# Patient Record
Sex: FEMALE | Race: BLACK OR AFRICAN AMERICAN | Employment: STUDENT | URBAN - METROPOLITAN AREA
[De-identification: names, ages, dates, MRNs, and addresses within clinical notes are randomized per-mention and may not be internally consistent; named-entity substitution may affect disease eponyms.]

---
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
## 2025-01-06 ENCOUNTER — OFFICE VISIT (OUTPATIENT)
Dept: INTERNAL MEDICINE CLINIC | Facility: CLINIC | Age: 20
End: 2025-01-06
Payer: COMMERCIAL

## 2025-01-06 VITALS
DIASTOLIC BLOOD PRESSURE: 80 MMHG | RESPIRATION RATE: 16 BRPM | WEIGHT: 266 LBS | HEIGHT: 67 IN | HEART RATE: 90 BPM | BODY MASS INDEX: 41.75 KG/M2 | SYSTOLIC BLOOD PRESSURE: 120 MMHG

## 2025-01-06 DIAGNOSIS — E66.01 CLASS 3 SEVERE OBESITY WITH SERIOUS COMORBIDITY AND BODY MASS INDEX (BMI) OF 45.0 TO 49.9 IN ADULT, UNSPECIFIED OBESITY TYPE (HCC): ICD-10-CM

## 2025-01-06 DIAGNOSIS — R73.03 PREDIABETES: ICD-10-CM

## 2025-01-06 DIAGNOSIS — E66.813 CLASS 3 SEVERE OBESITY WITH SERIOUS COMORBIDITY AND BODY MASS INDEX (BMI) OF 45.0 TO 49.9 IN ADULT, UNSPECIFIED OBESITY TYPE (HCC): ICD-10-CM

## 2025-01-06 DIAGNOSIS — Z51.81 ENCOUNTER FOR THERAPEUTIC DRUG MONITORING: Primary | ICD-10-CM

## 2025-01-06 RX ORDER — TIRZEPATIDE 5 MG/.5ML
5 INJECTION, SOLUTION SUBCUTANEOUS WEEKLY
Qty: 2 ML | Refills: 3 | Status: CANCELLED | OUTPATIENT
Start: 2025-01-06

## 2025-01-06 RX ORDER — TIRZEPATIDE 10 MG/.5ML
10 INJECTION, SOLUTION SUBCUTANEOUS WEEKLY
Qty: 2 ML | Refills: 2 | Status: SHIPPED | OUTPATIENT
Start: 2025-01-06

## 2025-01-06 RX ORDER — TIRZEPATIDE 7.5 MG/.5ML
7.5 INJECTION, SOLUTION SUBCUTANEOUS WEEKLY
Qty: 2 ML | Refills: 0 | Status: SHIPPED | OUTPATIENT
Start: 2025-01-06

## 2025-01-06 RX ORDER — PHENTERMINE HYDROCHLORIDE 37.5 MG/1
37.5 CAPSULE ORAL EVERY MORNING
Qty: 30 CAPSULE | Refills: 3 | Status: SHIPPED | OUTPATIENT
Start: 2025-01-06

## 2025-01-06 NOTE — PATIENT INSTRUCTIONS
Continue making lifestyle changes that focus on good nutrition, regular exercise and stress management.    Medication Plan: Continue Phentermine and increase Zepbound to 7.5 mg weekly x4 weeks and then 10 mg weekly thereafter.    Tips while taking an injectable weight loss medication:    Be an intuitive eater. Listen to your hunger and fullness signals, stopping when you are full.  Consume protein and produce in your day, striving for a rainbow of color of produce.  Reduce portions to staring size of 1 cup size and check in with your gut to see if you are full. Use a sand timer to slow down your eating pace to allow for 15-20 minutes to complete a meal and use the \"2 bite rule\".  Reduce refined sugars and high fat foods, as they may contribute to greater side effects of nausea and heartburn.  Stop eating 3 hours before bedtime to allow your food to digest.  Remain hydrated with water or non caloric and non caffeine beverages.  Use over the counter marcello lozenge/supplement to help reduce nausea if needed.  If you have been off your medication for more than 2 weeks please notify our office to determine next dosing, as return to previous dose may not be appropriate or tolerated.    Next steps to work on before next office visit include:  Keep up the great work!       I recommend self monitoring to support behavior change and to learn how your environment individually impacts YOUR body. This will help promote intuitive eating practices. Goal is to track nutrition 2x/week via paper log, 3ROAM Destiny or LoseIT! Destiny, and log weekly home scale weight.    Self-Monitoring - The Way to Successful Weight Management    By Kari Gr RD, RAYMOND, Pastora Roblero RD, RAYMOND, Will Angel PA-C, and Josefa Shaver MD    [facility] [email or web address] Winter 2008 Resource    One major and possibly most important behavioral interventional strategy for weight management and lifestyle change is self-monitoring. Behavioral interventions are a  central aspect in treatments to promote lifestyle changes that lead to weight-loss, prevent weight gain or weight regain and improve physical fitness. In the past, self-monitoring has unfortunately been one of the least popular techniques for those in weight management programs, and in some cases it is even thought of as a punishment. Because self-monitoring is critical for success with lifestyle changes, it is important to look at the various self monitoring techniques.    What is Self-monitoring?  Self-monitoring refers to the observing and recording of eating and exercise patterns, followed by feedback on the behaviors. The goal of self-monitoring is to increase self-awareness of target behaviors and outcomes, thus it can serve as an early warning system if problems are arising and can help track success. Some commonly used self-monitoring techniques include:    Food diaries  Regular self-weighing  Exercise logs  Equipment such as pedometers, accelerometers and metabolic devices  Food Logs and Diaries  One of the most common and important types of self-monitoring strategies in weight management programs is keeping a food log, in which individuals record foods, exercises or beverages as soon as they are consumed.    One important technique with food logs is individuals recording what they eat or drink as it is consumed; otherwise it may not give an accurate account of the day’s intake. A good “rule of thumb” for food logs is: “if you bite it, you write it!”    The minimum information for weight-loss that should be kept in food logs is type, amount and caloric content of food or beverage consumed. This provides the ability to track and balance the number of calories consumed throughout the day with the amount of calories expended throughout the day.    Other nutritional information that can be logged includes: time of day of eating, fat content and carbohydrate grams. Disease-specific food logs can also be kept.  For example: focusing on carbohydrate content instead of calories for patients with diabetes or insulin resistance.    Food Diaries  Another helpful tool in self-monitoring is keeping a food diary. Food diaries differ from food logs because they include more detailed information. They are useful if you are trying to find behavioral reasons or psychological aspects for eating.    Depending on the person and behavioral complexities involved, some food diaries could include the stress level, mood or feelings surrounding eating, activity or location or other environmental or emotional triggers for eating. The more complex or detailed, the better the feedback.    However, in today’s society it is almost impossible for most people to keep highly detailed daily food records over the long-term, therefore, compliance is often very low with detailed food diaries. By suggesting that patients keep a detailed food record for a few days each week, perhaps major areas of focus for nutritional and behavioral intervention can be recognized.    Logging Your Food Online  Online food logs and diaries or computer software are quick and convenient ways to keep records of foods consumed in our technologically advanced world. Many Web sites are available for tracking of foods and calories throughout the day, some of which are free and very easy to use.    You can look up food choices and/or alternative choices in online databases of more than 50,000 foods. Internet-savvy loggers may choose to keep their journals online. Others may just choose to use these databases as a more convenient way of looking up nutritional value of foods. Some free online diaries include:    Free Web sites for searching nutritional information are available, an example is www.Vacation Your Way.Activity Rocket. These Web sites may also offer exercise tracking and ideas, support, motivational tips and chat or discussion rooms.    Hand-held Calorie Counters  Another option for those  who are “on the go” are handheld devices for calorie counting. Some of the devices are stand-alone such as TopLogt® or National Bananaer®. Others need to connect to Web sites. Other devices are installed in your Palm or Pocket-PC such as Diet Diary by Theo Gio. They let you download updates when nutrition facts change, however, some of them use a lot of memory.    Regular Weighing  Weighing yourself is an important and simple self-monitoring behavior to serve as reminder of one’s eating and physical activity habits. Although it may be hard and sometimes discouraging to weigh yourself while losing weight, it is recommended to weigh yourself weekly, preferably outside of the home on the same scale.    Using the scale at the local gym or exercise facility or your doctor’s office may be more accurate than home scales. However, if this is unrealistic, it is okay to use a home scale. Try to weigh yourself at the same time of day and the same day of the week.    Writing down your weekly weights on a table, graph or calendar can help you keep track of your success or to help you get back on track more quickly. It is important to note that weighing yourself more frequently than weekly is not recommended, as day to day fluctuations are not indicators of actual weight. Regular monitoring of your weight is also essential to help you maintain your weight after losing weight.    Exercise Logs  Another self-monitoring technique, along the same lines as food logs and diaries, is keeping an exercise log or diary. The number of minutes engaged and type and level of exertion of physical activity should ideally be recorded.    An important and often forgotten aspect of exercise logs is the level of perceived exertion. Walking for 30 minutes, at an easy compared to a hard pace, will result in different levels of calories burned and cardiovascular impact.    Typically, an easy physical activity that does not increase heart rate  much, or alter breathing would usually be the pace that you walk around work or go shopping. Moderate level of physical exertion is when you are getting a mildly increased heart and breathing rate. Heavy or hard level of physical exertion would be sweating, increased heart rate (target heart rate range) as well as increased breathing.    Remember, physical activity can be done at one time or intermittently throughout the day. Logging exercise can be a positive feedback or a reminder to incorporate more exercise or physical activity into your daily routine.    Initial activities may be walking, riding a stationary bike or swimming at a slow pace. Other types of exercise that can be fun are dancing, exercise videos or chair exercises. You should try to aim for 30 minutes of exercise on most days of the week.    Many people try to start out with exercise on three or four days of the week. However, if you can get yourself exercising most to all days of the week, even if only for 10 or 15 minutes, it will become more of a routine for you.    Healthy Lifestyle Tip  All adults should set a long-term goal to accumulate at least 30 minutes or more of moderate-intensity physical activity on most to all days of the week. Also, try to increase activities of daily living such as taking the stairs instead of the elevator, parking further away or walking to a bathroom that is further from your desk. Reducing sedentary time is a good strategy to increase activity by undertaking frequent, less strenuous activities. With time, you may be able to engage in more strenuous activities.    Pedometers  Self-monitoring tools are becoming more and more popular and accurate. One of the simplest of these self-monitoring tools is a pedometer. Pedometers give objective data of physical activity throughout the day. Pedometers can be found in almost any consumer MobileAware or retail store.    Some of the more popular manufactures include Digi-Walker,  Omron, Acumen, Bodytrend, Red Advertising, SportWantering, Freestyle, Immunity Project, AccuStep and many others. ContaAzul and [employer] make pedometer of speedometer devices that calculate steps and speed using GPS. These clip-on devices are inexpensive, ranging from less than $15 up to $75.    Many people get an average of 3,000 steps per day with daily activity. In order to burn off extra calories for weight-loss, walking 10,000 steps per day is recommended. For regular health, a minimum of 6,000 steps per day is required. Research suggests that a deliberate walk of 4,000-6,000 steps will help with weight-loss. It is often a good idea to keep track of your daily steps taken in your exercise log.    Pedometers can be frustrating for those who are more interested in distance traveled. Focusing on the number of steps and ways to incorporate more steps throughout the day will make as much of a difference with weight-loss as actual distance does. Pedometers encourage people to find ways to add more steps throughout the day.    Because step counting is becoming more popular, advances are being made in the technology behind pedometers. New pedometers display steps and count them accurately. They are meant to be worn everyday and all day, as motivation to keep stepping, Most are small and comfortable to wear.    Pedometers sense your body motion, counting your footsteps usually by a turned pendulum technology, a coiled spring mechanism and a hairspring mechanism (which is the least accurate). The unit should be accurate in its count when you wear it correctly. You may need to experiment with where to wear it. You can measure your stride and then the pedometer can estimate distance traveled.    Some pedometers today offer multifunction options like calorie estimates, clocks, timers, stopwatches, speed estimators, seven-day memory or pulse rate readers, voice feedback and radios.    Accelerometers  Although pedometers are very  cost-effective, one of the main flaws in using pedometers, however, is that they do not record intensity (how hard) or duration (how long) or frequency (how often) movement occurs. Accelerometers are devices that can objectively measure frequency, duration and intensity of physical activity.    Accelerometers provide a high level of accuracy when assessing physical activity. There are a variety of commercially available accelerometers, or activity monitors, which come in a wide range of prices anywhere from $50 to $1,000. BioTrainer and Nike are examples of affordable accelerometers.    Many of the more expensive accelerometers are used only in research or as a part of a hospital-based program. These monitors are more complex than pedometers in that they display and store more complex data. Some are designed to download to a computer for analysis of intensity levels, movements and physical activity patterns. They can also be used to estimate calories burned or energy expenditure.    Accelerometers have sophisticated sensors that convert physical movement into an electrical signal that is relative to the muscular force needed to produce the work. Accelerometers can be found in uniaxial or triaxial measures. Uniaxial accelerometers measure in a single plane and can be attached to the trunk or limbs. Triaxial accelerometers measure along three planes: vertical, medial-lateral and anterior-posterior.    Although accelerometers are a step up from pedometers in accuracy of physical activity, they cannot register resistance. Therefore, if you are strength training or adding resistance to your bike or treadmill or adding an incline to your walking, it will not be able to discern the added level of energy required to do that work.    Metabolic Devices  One of the most accurate and most expensive tools for self-monitoring are tools that have very sophisticated monitoring and interpreting sensors for calories burned. Many of  these devices have options to subscribe to a Web-based calorie counter system that integrates the amount of calories burned measured by the equipment and your calories consumed that you enter in easy to use food logs.    These devices are more accurate in measurements of calories expended because they use not only accelerometer technology, but also heat flux sensors, galvanic skin response (to measure physical exertion and emotional stimuli) and skin temperature gauges. Some also include heart rate monitoring techniques. All of these technologies combined lead to a very accurate measurement of calories expended throughout the day.    These devices can determine if you are sitting, sleeping, jogging, walking, lifting weights or riding in a car. Many of these devices are very expensive and used primarily for research, however, some are available commercially.    This technology is also employed by hospital-based programs. Patients wear the hospital’s armband and track their nutrition on the Web site or computer-based program for typically one to two weeks. When they return to the clinic, the information will be uploaded and the practitioners will be able to work with the patients with objective data on metabolic lifestyle patterns.    Practitioners can also monitor patients on integrated software applications to provide consultations without being face to face. Practitioners have the ability to set daily goals to tailor programs to the individual patient. These are great tools to help objectively monitor behavior and physical activity, as well as providing real time feed back to the patient. [employer] is one company that offers this technology.    Conclusion  Although specific diseases and treatments vary, behavior modification is the major key in weight-loss or prevention and decreases the risk of diseases. Self-monitoring is a key to behavior modifications, and there are a multitude of ways to self-monitor. With  technology advancements, self-monitoring techniques are changing and improving to help defeat some of the major barriers to compliance. The bottom line is that no matter how you do it, self-monitoring should be an important part of your weight-loss, weight maintenance or healthy lifestyle change. Then, the next step is to be sure the self-monitoring translates into positive behavior changes with regards to diet and exercise.    Balanced Nutrition includes:     Build the mentality of Food 4 Fuel. Clean eating with whole foods and eliminating/reducing ultra processed foods.  Be an intuitive eater and using mindful eating practices.  Eat a balanced plate with protein and produce at all meals: 1/4 plate- protein, 1/2 plate non starchy veggies, and 1/4 plate fruit or complex carbohydrate.  Drink water with all meals and use a salad plate to naturally reduce portions.  Eliminate/reduce late night eating by stopping after 7pm. Allowing your body to fast for 12 hours (drink only water, tea or black coffee without any additives).            What are proteins?  Proteins are one of three primary macronutrients that provide energy to the human body, along with fats and carbohydrates. Proteins are also responsible for a large portion of the work that is done in cells; they are necessary for proper structure and function of tissues and organs, and also act to regulate them. They are comprised of a number of amino acids that are essential to proper body function, and serve as the building blocks of body tissue.  There are 20 different amino acids in total, and the sequence of amino acids determines a protein's structure and function. While some amino acids can be synthesized in the body, there are 9 amino acids that humans can only obtain from dietary sources (insufficient amounts of which may sometimes result in death), termed essential amino acids. Foods that provide all of the essential amino acids are called complete protein  sources, and include both animal (meat, dairy, eggs, fish) as well as plant-based sources (soy, quinoa, buckwheat).    Proteins can be categorized based on the function they provide to the body. Below is a list of some types of proteins:  Antibody--proteins that protect the body from foreign particles, such as viruses and bacteria, by binding to them  Enzyme--proteins that help form new molecules as well as perform the many chemical reactions that occur throughout the body  Messenger--proteins that transmit signals throughout the body to maintain body processes  Structural component--proteins that act as building blocks for cells that ultimately allow the body to move  Transport/storage--proteins that move molecules throughout the body  As can be seen, proteins have many important roles throughout the body, and as such, it is important to provide sufficient nutrition to the body to maintain healthy protein levels.    How much protein do I need?  The amount of protein that the human body requires daily is dependent on many conditions, including overall energy intake, growth of the individual, and physical activity level. It is often estimated based on body weight, as a percentage of total caloric intake (10-35%), or based on age alone. 0.8g/kg of body weight is a commonly cited recommended dietary allowance (RDA). This value is the minimum recommended value to maintain basic nutritional requirements, but consuming more protein, up to a certain point, maybe beneficial, depending on the sources of the protein.  The recommended range of protein intake is between 0.8 g/kg and 1.8 g/kg of body weight, dependent on the many factors listed above. People who are highly active, or who wish to build more muscle should generally consume more protein. Some sources suggest consuming between 1.8 to 2 g/kg for those who are highly active. The amount of protein a person should consume, to date, is not an exact science, and each  individual should consult a specialist, be it a dietitian, doctor, or , to help determine their individual needs.    Foods high in protein  There are many different combinations of food that a person can eat to meet their protein intake requirements. For many people, a large portion of protein intake comes from meat and dairy, though it is possible to get enough protein while meeting certain dietary restrictions you might have. Generally, it is easier to meet your RDA of protein by consuming meat and dairy, but an excess of either can have a negative health impact. There are plenty of plant-based protein options, but they generally contain less protein in a given serving. Ideally, a person should consume a mixture of meat, dairy, and plant-based foods in order to meet their RDA and have a balanced diet replete with nutrients.    If possible, consuming a variety of complete proteins is recommended. A complete protein is a protein that contains a good amount of each of the nine essential amino acids required in the human diet. Examples of complete protein foods or meals include:    Meat/Dairy examples  Eggs  Chicken breast  Cottage cheese  Greek yogurt  Milk  Lean beef  Tuna  Turkey breast  Fish  Shrimp    Vegan/plant-based examples  Buckwheat  Hummus and areli  Soy products (tofu, tempeh, edamame beans)  Peanut butter on toast or some other bread  Beans and rice  Quinoa  Hemp and naomi seeds  Spirulina  Generally, meat, poultry, fish, eggs, and dairy products are complete protein sources. Nuts and seeds, legumes, grains, and vegetables, among other things, are usually incomplete proteins. There is nothing wrong with incomplete proteins however, and there are many healthy, high protein foods that are incomplete proteins. As long as you consume a sufficient variety of incomplete proteins to get all the required amino acids, it is not necessary to specifically eat complete protein foods. In fact, certain  high fat red meats for example, a common source of complete proteins, can be unhealthy.   Below are some examples of high protein foods that are not complete proteins:  Almonds  Oats  Broccoli  Lentils  Ryan bread  Bj seeds  Pumpkin seeds  Peanuts  Henry sprouts  Grapefruit  Green peas  Avocados  Mushrooms  As can be seen, there are many different foods a person can consume to meet their RDA of protein. The examples provided above do not constitute an exhaustive list of high protein or complete protein foods. As with everything else, balance is important, and the examples provided above are an attempt at providing a list of healthier protein options (when consumed in moderation).    Amount of protein in common food      Protein Amount  Milk (1 cup/8 oz)  8 g  Egg (1 large/50 g)  6 g  Meat (1 slice / 2 oz)  14 g  Seafood (2 oz)  16 g  Bread (1 slice/64 g)   8 g  Corn (1 cup/166 g)  16 g  Rice (1 cup/195 g)  5 g  Dry Bean (1 cup/92 g)   16 g  Nuts (1 cup/92 g)    20 g  Fruits and Veggie (1 cup)  1 g    Data above taken from www.calculator.net    The Power of Protein:    High Protein Foods:  FISH  (3-6 ounces/meal)  All types of fish  Seafood (shrimp, scallops, clams, mussels, lobster)  EGGS     2-3 eggs/meal  DAIRY (2/3 to 1 ½ cup)  Cottage cheese   Greek yogurt  PLANTS (½-3/4 cup/meal)  Legumes: Dried beans and peas (black beans, oconnell beans, garbanzo beans, kidney, cannellini, navy, split peas, black eyed peas)  Lentils  Quinoa  Soy (edamame, tofu)  PORK   (3-6 oz/meal)  Tenderloin  Pork chop  Top loin roast, boneless  Sirloin roast, boneless  Nigerien vasquez (nitrate free)  Boiled deli ham (nitrate free)    Additional Protein Sources:  BEEF    (3-6 oz/meal)  Flank steak       Skirt steak  Bottom round(rump roast), select   Ground beef, 90% lean (ground sirloin)  Jarod eye steak, choice  Eye of round roast, choice   POULTRY  (3-6 oz/meal)  Ground chicken or turkey  Chicken, no skin  Turkey, no skin  PROTEIN  SHAKES: SHILA and Debbie (plant based and dairy free), Corepower by SocialSamba, Casinityreal (plant based option available), Premier Protein, [employer] Complete (www.Joox.com)  PROTEIN BARS: RXBAR, PowerCrunch, Quest, Barebells, Mosh      Stopped Losing Weight? Fight Your Way through a Weight Plateau  March 12, 2019  Posted in Blog, Motivation  By Your Weight [facility] [facility]     You've managed your food intake, had your fair share of sweat sessions, and you're on a roll with weight-loss. Congrats! Confidence and determination have propelled you to success.  But all of a sudden, the scale stops moving and will no long budge. You haven't slacked off, so what's the deal? You might have found yourself in a frustrating weight plateau.  What's a Weight Plateau?  Plateaus happen when you stop losing weight, even if you're doing everything “right” -- cutting back on portion sizes, eating healthy foods and working out like your life depends on it.  The sudden halt in progress might seem surprising if your initial weight-loss was fast and relatively simple. However, plateaus are common and sometimes inevitable if your metabolism has declined -- usually from muscle-loss and significant changes to your body weight. This means you're burning less calories than before, even if you're doing the same thing.  Tips to Fight back  Weight plateaus can be trying, especially if your motivation was fed by seeing progress on the scale. Still, you haven't done anything wrong. You just need a new game plan!  Are You Building Muscle?  Strength training builds muscle which boosts your metabolism and burns far more calories over time than cardio. Try the “progressive overload” approach of gradually increasing stressed placed on the body during weight training. This includes slowly adding weight and reps.  Adjust Your Calorie Intake  After weight-loss, you may be burning less calories if your metabolism has decreased. So, you might also need to  consume less calories. Re-examine and adjust your food behavior.  Embrace Change in Your Routine  Try a new workout in a new location, preferably something invigorating that you've never done before. Also consider some new recipes to prevent meals from getting boring. Changes to your normal routine are fresh, exciting and usually very motivating.  Manage Stress  Stress can often put the brakes on weight-loss by triggering food cravings and releasing the hormone cortisol. Identify healthy and sustainable stress management techniques  to remain relaxed, focused and balanced in all aspects and areas of your body.  Eat More Protein  Protein burns more calories during digestion than other nutrients, and some studies show it to have fat-burning catalysts. It also keeps you full and aids in building lean muscle mass.  Ensure You're Properly Hydrated  Even when you're mildly dehydrated, your body can crave food despite a true lack in hunger. You should strive for at least  fluid ounces of water each day (even more when you exercise) and replace coffee, tea and alcoholic beverages with water when you can.  Plateaus Can be Conquered  If you've still got weight to lose that will benefit your health, don't feel discouraged in the midst of a plateau. It happens, and it's a sign that you've already made great progress!  Instead, try thinking of the plateau as your next great challenge. When you re-work your mindset and get outside your comfort zone, you'll continue to surprise yourself.

## 2025-01-06 NOTE — PROGRESS NOTES
Ashley Ramirez is a 19 year old female presents today with boyfriend for follow-up on medical weight loss program for the treatment of overweight, obesity, or morbid obesity with associated prediabetes.    S:  Current weight   Wt Readings from Last 6 Encounters:   01/06/25 266 lb (120.7 kg) (>99%, Z= 2.62)*   12/29/24 265 lb (120.2 kg) (>99%, Z= 2.61)*   11/07/24 267 lb (121.1 kg) (>99%, Z= 2.62)*   10/30/24 260 lb (117.9 kg) (>99%, Z= 2.57)*   10/17/24 215 lb (97.5 kg) (98%, Z= 2.15)*   08/28/24 265 lb (120.2 kg) (>99%, Z= 2.59)*     * Growth percentiles are based on [facility] (Girls, 2-20 Years) data.    AND BMI Body mass index is 41.66 kg/m²..    Patient has lost 1# since LOV 2 month ago.  She has been tolerating switch to Zepbound without SEs. Taking Phentermine often every other day. Feeling at a weight plateau. Exercising regular, reduced snacking and meals. Sleeping well and stress reduced since on break from school and only working. No lifestyle log completed.    Testing/consult completed since LOV: Dietician: no    Social hx and PMH reviewed. Employed in retail. In relationship and living with family.    REVIEW OF SYSTEMS:  GENERAL: feels well otherwise  LUNGS: denies shortness of breath with exertion  CARDIOVASCULAR: denies chest pain on exertion, denies palpitations or pedal edema  GI: denies abdominal pain.  No N/V/D/C  MUSCULOSKELETAL: no acute joint or muscle pain  NEURO: denies headaches  PSYCH: denies change in behavior or mood, denies feeling sad or depressed    EXAM:  /80   Pulse 90   Resp 16   Ht 5' 7\" (1.702 m)   Wt 266 lb (120.7 kg)   LMP 12/26/2024 (Approximate)   BMI 41.66 kg/m²   GENERAL: well developed, well nourished, in no apparent distress, morbidly obese  EYES: conjunctiva pink, sclera non icteric, PERRLA  LUNGS: CTA in all fields, breathing non labored  CARDIO: RRR without murmur, normal S1 and S2 without clicks or gallops, no pedal edema.  GI: +BS  NEURO/MS: motor and  sensory grossly intact  PSYCH: pleasant, cooperative, normal mood and affect    ASSESSMENT AND PLAN:  Reviewed Initial Weight Data and Goal Weight Loss:       Encounter Diagnoses   Name Primary?    Encounter for therapeutic drug monitoring Yes    Class 3 severe obesity with serious comorbidity and body mass index (BMI) of 45.0 to 49.9 in adult, unspecified obesity type (HCC)     Prediabetes            No orders of the defined types were placed in this encounter.      Meds & Refills for this Visit:  Requested Prescriptions     Signed Prescriptions Disp Refills    Phentermine HCl 37.5 MG Oral Cap 30 capsule 3     Sig: Take 1 capsule (37.5 mg total) by mouth every morning.    Tirzepatide-Weight Management (ZEPBOUND) 7.5 MG/0.5ML Subcutaneous Solution Auto-injector 2 mL 0     Sig: Inject 7.5 mg into the skin once a week.    Tirzepatide-Weight Management (ZEPBOUND) 10 MG/0.5ML Subcutaneous Solution Auto-injector 2 mL 2     Sig: Inject 10 mg into the skin once a week. Start after completing full 4 weeks on 7.5 mg weekly dose.       Imaging & Consults:  None      Plan:  Patient has lost 1# since LOV 2 month ago on Zepbound 5 mg weekly with a total weight loss of 24# since initial consult on 6/11/24 with initial weight of 290# and BMI 45.  Weight loss goal: get down to 180# in 6 months and 175# in 1 year.  Increase Zepbound as directed. Avoid Wegovy d/t GI SEs beyond .5 mg weekly.   on self monitoring, balanced nutrition and breaking the weight plateau. See patient instructions below for additional plans and patient counseling.      Patient Instructions   Continue making lifestyle changes that focus on good nutrition, regular exercise and stress management.    Medication Plan: Continue Phentermine and increase Zepbound to 7.5 mg weekly x4 weeks and then 10 mg weekly thereafter.    Tips while taking an injectable weight loss medication:    Be an intuitive eater. Listen to your hunger and fullness signals, stopping  when you are full.  Consume protein and produce in your day, striving for a rainbow of color of produce.  Reduce portions to staring size of 1 cup size and check in with your gut to see if you are full. Use a sand timer to slow down your eating pace to allow for 15-20 minutes to complete a meal and use the \"2 bite rule\".  Reduce refined sugars and high fat foods, as they may contribute to greater side effects of nausea and heartburn.  Stop eating 3 hours before bedtime to allow your food to digest.  Remain hydrated with water or non caloric and non caffeine beverages.  Use over the counter marcello lozenge/supplement to help reduce nausea if needed.  If you have been off your medication for more than 2 weeks please notify our office to determine next dosing, as return to previous dose may not be appropriate or tolerated.    Next steps to work on before next office visit include:  Keep up the great work!       I recommend self monitoring to support behavior change and to learn how your environment individually impacts YOUR body. This will help promote intuitive eating practices. Goal is to track nutrition 2x/week via paper log, Equiphon Destiny or MaxPreps Destiny, and log weekly home scale weight.    Self-Monitoring - The Way to Successful Weight Management    By Kari Gr RD, LDN, Pastora Roblero RD, MELCHORN, Will Angel PA-C, and Josefa Shaver MD    [facility] [email or web address] Winter 2008 Resource    One major and possibly most important behavioral interventional strategy for weight management and lifestyle change is self-monitoring. Behavioral interventions are a central aspect in treatments to promote lifestyle changes that lead to weight-loss, prevent weight gain or weight regain and improve physical fitness. In the past, self-monitoring has unfortunately been one of the least popular techniques for those in weight management programs, and in some cases it is even thought of as a punishment. Because self-monitoring is  critical for success with lifestyle changes, it is important to look at the various self monitoring techniques.    What is Self-monitoring?  Self-monitoring refers to the observing and recording of eating and exercise patterns, followed by feedback on the behaviors. The goal of self-monitoring is to increase self-awareness of target behaviors and outcomes, thus it can serve as an early warning system if problems are arising and can help track success. Some commonly used self-monitoring techniques include:    Food diaries  Regular self-weighing  Exercise logs  Equipment such as pedometers, accelerometers and metabolic devices  Food Logs and Diaries  One of the most common and important types of self-monitoring strategies in weight management programs is keeping a food log, in which individuals record foods, exercises or beverages as soon as they are consumed.    One important technique with food logs is individuals recording what they eat or drink as it is consumed; otherwise it may not give an accurate account of the day’s intake. A good “rule of thumb” for food logs is: “if you bite it, you write it!”    The minimum information for weight-loss that should be kept in food logs is type, amount and caloric content of food or beverage consumed. This provides the ability to track and balance the number of calories consumed throughout the day with the amount of calories expended throughout the day.    Other nutritional information that can be logged includes: time of day of eating, fat content and carbohydrate grams. Disease-specific food logs can also be kept. For example: focusing on carbohydrate content instead of calories for patients with diabetes or insulin resistance.    Food Diaries  Another helpful tool in self-monitoring is keeping a food diary. Food diaries differ from food logs because they include more detailed information. They are useful if you are trying to find behavioral reasons or psychological aspects  for eating.    Depending on the person and behavioral complexities involved, some food diaries could include the stress level, mood or feelings surrounding eating, activity or location or other environmental or emotional triggers for eating. The more complex or detailed, the better the feedback.    However, in today’s society it is almost impossible for most people to keep highly detailed daily food records over the long-term, therefore, compliance is often very low with detailed food diaries. By suggesting that patients keep a detailed food record for a few days each week, perhaps major areas of focus for nutritional and behavioral intervention can be recognized.    Logging Your Food Online  Online food logs and diaries or computer software are quick and convenient ways to keep records of foods consumed in our technologically advanced world. Many Web sites are available for tracking of foods and calories throughout the day, some of which are free and very easy to use.    You can look up food choices and/or alternative choices in online databases of more than 50,000 foods. Internet-savvy loggers may choose to keep their journals online. Others may just choose to use these databases as a more convenient way of looking up nutritional value of foods. Some free online diaries include:    Free Web sites for searching nutritional information are available, an example is www.[employer]. These Web sites may also offer exercise tracking and ideas, support, motivational tips and chat or discussion rooms.    Hand-held Calorie Counters  Another option for those who are “on the go” are handheld devices for calorie counting. Some of the devices are stand-alone such as CalorieSmart® or HealthFitCounter®. Others need to connect to Web sites. Other devices are installed in your Palm or Pocket-PC such as Diet Diary by [employer]. They let you download updates when nutrition facts change, however, some of them use a lot of  memory.    Regular Weighing  Weighing yourself is an important and simple self-monitoring behavior to serve as reminder of one’s eating and physical activity habits. Although it may be hard and sometimes discouraging to weigh yourself while losing weight, it is recommended to weigh yourself weekly, preferably outside of the home on the same scale.    Using the scale at the local gym or exercise facility or your doctor’s office may be more accurate than home scales. However, if this is unrealistic, it is okay to use a home scale. Try to weigh yourself at the same time of day and the same day of the week.    Writing down your weekly weights on a table, graph or calendar can help you keep track of your success or to help you get back on track more quickly. It is important to note that weighing yourself more frequently than weekly is not recommended, as day to day fluctuations are not indicators of actual weight. Regular monitoring of your weight is also essential to help you maintain your weight after losing weight.    Exercise Logs  Another self-monitoring technique, along the same lines as food logs and diaries, is keeping an exercise log or diary. The number of minutes engaged and type and level of exertion of physical activity should ideally be recorded.    An important and often forgotten aspect of exercise logs is the level of perceived exertion. Walking for 30 minutes, at an easy compared to a hard pace, will result in different levels of calories burned and cardiovascular impact.    Typically, an easy physical activity that does not increase heart rate much, or alter breathing would usually be the pace that you walk around work or go shopping. Moderate level of physical exertion is when you are getting a mildly increased heart and breathing rate. Heavy or hard level of physical exertion would be sweating, increased heart rate (target heart rate range) as well as increased breathing.    Remember, physical  activity can be done at one time or intermittently throughout the day. Logging exercise can be a positive feedback or a reminder to incorporate more exercise or physical activity into your daily routine.    Initial activities may be walking, riding a stationary bike or swimming at a slow pace. Other types of exercise that can be fun are dancing, exercise videos or chair exercises. You should try to aim for 30 minutes of exercise on most days of the week.    Many people try to start out with exercise on three or four days of the week. However, if you can get yourself exercising most to all days of the week, even if only for 10 or 15 minutes, it will become more of a routine for you.    Healthy Lifestyle Tip  All adults should set a long-term goal to accumulate at least 30 minutes or more of moderate-intensity physical activity on most to all days of the week. Also, try to increase activities of daily living such as taking the stairs instead of the elevator, parking further away or walking to a bathroom that is further from your desk. Reducing sedentary time is a good strategy to increase activity by undertaking frequent, less strenuous activities. With time, you may be able to engage in more strenuous activities.    Pedometers  Self-monitoring tools are becoming more and more popular and accurate. One of the simplest of these self-monitoring tools is a pedometer. Pedometers give objective data of physical activity throughout the day. Pedometers can be found in almost any consumer catalog or retail store.    Some of the more popular manufactures include Digi-Walker, [employer], [employer], Eventyard, Logicworks, Haiku Deck, Freestyle, Pure360, Simple.TV and many others. [place] make pedometer of speedometer devices that calculate steps and speed using GPS. These clip-on devices are inexpensive, ranging from less than $15 up to $75.    Many people get an average of 3,000 steps per day with daily activity. In  order to burn off extra calories for weight-loss, walking 10,000 steps per day is recommended. For regular health, a minimum of 6,000 steps per day is required. Research suggests that a deliberate walk of 4,000-6,000 steps will help with weight-loss. It is often a good idea to keep track of your daily steps taken in your exercise log.    Pedometers can be frustrating for those who are more interested in distance traveled. Focusing on the number of steps and ways to incorporate more steps throughout the day will make as much of a difference with weight-loss as actual distance does. Pedometers encourage people to find ways to add more steps throughout the day.    Because step counting is becoming more popular, advances are being made in the technology behind pedometers. New pedometers display steps and count them accurately. They are meant to be worn everyday and all day, as motivation to keep stepping, Most are small and comfortable to wear.    Pedometers sense your body motion, counting your footsteps usually by a turned pendulum technology, a coiled spring mechanism and a hairspring mechanism (which is the least accurate). The unit should be accurate in its count when you wear it correctly. You may need to experiment with where to wear it. You can measure your stride and then the pedometer can estimate distance traveled.    Some pedometers today offer multifunction options like calorie estimates, clocks, timers, stopwatches, speed estimators, seven-day memory or pulse rate readers, voice feedback and radios.    Accelerometers  Although pedometers are very cost-effective, one of the main flaws in using pedometers, however, is that they do not record intensity (how hard) or duration (how long) or frequency (how often) movement occurs. Accelerometers are devices that can objectively measure frequency, duration and intensity of physical activity.    Accelerometers provide a high level of accuracy when assessing  physical activity. There are a variety of commercially available accelerometers, or activity monitors, which come in a wide range of prices anywhere from $50 to $1,000. [employer] and [employer] are examples of affordable accelerometers.    Many of the more expensive accelerometers are used only in research or as a part of a hospital-based program. These monitors are more complex than pedometers in that they display and store more complex data. Some are designed to download to a computer for analysis of intensity levels, movements and physical activity patterns. They can also be used to estimate calories burned or energy expenditure.    Accelerometers have sophisticated sensors that convert physical movement into an electrical signal that is relative to the muscular force needed to produce the work. Accelerometers can be found in uniaxial or triaxial measures. Uniaxial accelerometers measure in a single plane and can be attached to the trunk or limbs. Triaxial accelerometers measure along three planes: vertical, medial-lateral and anterior-posterior.    Although accelerometers are a step up from pedometers in accuracy of physical activity, they cannot register resistance. Therefore, if you are strength training or adding resistance to your bike or treadmill or adding an incline to your walking, it will not be able to discern the added level of energy required to do that work.    Metabolic Devices  One of the most accurate and most expensive tools for self-monitoring are tools that have very sophisticated monitoring and interpreting sensors for calories burned. Many of these devices have options to subscribe to a Web-based calorie counter system that integrates the amount of calories burned measured by the equipment and your calories consumed that you enter in easy to use food logs.    These devices are more accurate in measurements of calories expended because they use not only accelerometer technology, but also heat flux  sensors, galvanic skin response (to measure physical exertion and emotional stimuli) and skin temperature gauges. Some also include heart rate monitoring techniques. All of these technologies combined lead to a very accurate measurement of calories expended throughout the day.    These devices can determine if you are sitting, sleeping, jogging, walking, lifting weights or riding in a car. Many of these devices are very expensive and used primarily for research, however, some are available commercially.    This technology is also employed by hospital-based programs. Patients wear the hospital’s armband and track their nutrition on the Web site or computer-based program for typically one to two weeks. When they return to the clinic, the information will be uploaded and the practitioners will be able to work with the patients with objective data on metabolic lifestyle patterns.    Practitioners can also monitor patients on integrated software applications to provide consultations without being face to face. Practitioners have the ability to set daily goals to tailor programs to the individual patient. These are great tools to help objectively monitor behavior and physical activity, as well as providing real time feed back to the patient. [employer] is one company that offers this technology.    Conclusion  Although specific diseases and treatments vary, behavior modification is the major key in weight-loss or prevention and decreases the risk of diseases. Self-monitoring is a key to behavior modifications, and there are a multitude of ways to self-monitor. With technology advancements, self-monitoring techniques are changing and improving to help defeat some of the major barriers to compliance. The bottom line is that no matter how you do it, self-monitoring should be an important part of your weight-loss, weight maintenance or healthy lifestyle change. Then, the next step is to be sure the self-monitoring translates  into positive behavior changes with regards to diet and exercise.    Balanced Nutrition includes:     Build the mentality of Food 4 Fuel. Clean eating with whole foods and eliminating/reducing ultra processed foods.  Be an intuitive eater and using mindful eating practices.  Eat a balanced plate with protein and produce at all meals: 1/4 plate- protein, 1/2 plate non starchy veggies, and 1/4 plate fruit or complex carbohydrate.  Drink water with all meals and use a salad plate to naturally reduce portions.  Eliminate/reduce late night eating by stopping after 7pm. Allowing your body to fast for 12 hours (drink only water, tea or black coffee without any additives).            What are proteins?  Proteins are one of three primary macronutrients that provide energy to the human body, along with fats and carbohydrates. Proteins are also responsible for a large portion of the work that is done in cells; they are necessary for proper structure and function of tissues and organs, and also act to regulate them. They are comprised of a number of amino acids that are essential to proper body function, and serve as the building blocks of body tissue.  There are 20 different amino acids in total, and the sequence of amino acids determines a protein's structure and function. While some amino acids can be synthesized in the body, there are 9 amino acids that humans can only obtain from dietary sources (insufficient amounts of which may sometimes result in death), termed essential amino acids. Foods that provide all of the essential amino acids are called complete protein sources, and include both animal (meat, dairy, eggs, fish) as well as plant-based sources (soy, quinoa, buckwheat).    Proteins can be categorized based on the function they provide to the body. Below is a list of some types of proteins:  Antibody--proteins that protect the body from foreign particles, such as viruses and bacteria, by binding to  them  Enzyme--proteins that help form new molecules as well as perform the many chemical reactions that occur throughout the body  Messenger--proteins that transmit signals throughout the body to maintain body processes  Structural component--proteins that act as building blocks for cells that ultimately allow the body to move  Transport/storage--proteins that move molecules throughout the body  As can be seen, proteins have many important roles throughout the body, and as such, it is important to provide sufficient nutrition to the body to maintain healthy protein levels.    How much protein do I need?  The amount of protein that the human body requires daily is dependent on many conditions, including overall energy intake, growth of the individual, and physical activity level. It is often estimated based on body weight, as a percentage of total caloric intake (10-35%), or based on age alone. 0.8g/kg of body weight is a commonly cited recommended dietary allowance (RDA). This value is the minimum recommended value to maintain basic nutritional requirements, but consuming more protein, up to a certain point, maybe beneficial, depending on the sources of the protein.  The recommended range of protein intake is between 0.8 g/kg and 1.8 g/kg of body weight, dependent on the many factors listed above. People who are highly active, or who wish to build more muscle should generally consume more protein. Some sources suggest consuming between 1.8 to 2 g/kg for those who are highly active. The amount of protein a person should consume, to date, is not an exact science, and each individual should consult a specialist, be it a dietitian, doctor, or , to help determine their individual needs.    Foods high in protein  There are many different combinations of food that a person can eat to meet their protein intake requirements. For many people, a large portion of protein intake comes from meat and dairy, though  it is possible to get enough protein while meeting certain dietary restrictions you might have. Generally, it is easier to meet your RDA of protein by consuming meat and dairy, but an excess of either can have a negative health impact. There are plenty of plant-based protein options, but they generally contain less protein in a given serving. Ideally, a person should consume a mixture of meat, dairy, and plant-based foods in order to meet their RDA and have a balanced diet replete with nutrients.    If possible, consuming a variety of complete proteins is recommended. A complete protein is a protein that contains a good amount of each of the nine essential amino acids required in the human diet. Examples of complete protein foods or meals include:    Meat/Dairy examples  Eggs  Chicken breast  Cottage cheese  Greek yogurt  Milk  Lean beef  Tuna  Turkey breast  Fish  Shrimp    Vegan/plant-based examples  Buckwheat  Hummus and areli  Soy products (tofu, tempeh, edamame beans)  Peanut butter on toast or some other bread  Beans and rice  Quinoa  Hemp and bj seeds  Spirulina  Generally, meat, poultry, fish, eggs, and dairy products are complete protein sources. Nuts and seeds, legumes, grains, and vegetables, among other things, are usually incomplete proteins. There is nothing wrong with incomplete proteins however, and there are many healthy, high protein foods that are incomplete proteins. As long as you consume a sufficient variety of incomplete proteins to get all the required amino acids, it is not necessary to specifically eat complete protein foods. In fact, certain high fat red meats for example, a common source of complete proteins, can be unhealthy.   Below are some examples of high protein foods that are not complete proteins:  Almonds  Oats  Broccoli  Lentils  Ryan bread  Bj seeds  Pumpkin seeds  Peanuts  [place] sprouts  Grapefruit  Green peas  Avocados  Mushrooms  As can be seen, there are many  different foods a person can consume to meet their RDA of protein. The examples provided above do not constitute an exhaustive list of high protein or complete protein foods. As with everything else, balance is important, and the examples provided above are an attempt at providing a list of healthier protein options (when consumed in moderation).    Amount of protein in common food      Protein Amount  Milk (1 cup/8 oz)  8 g  Egg (1 large/50 g)  6 g  Meat (1 slice / 2 oz)  14 g  Seafood (2 oz)  16 g  Bread (1 slice/64 g)   8 g  Corn (1 cup/166 g)  16 g  Rice (1 cup/195 g)  5 g  Dry Bean (1 cup/92 g)   16 g  Nuts (1 cup/92 g)    20 g  Fruits and Veggie (1 cup)  1 g    Data above taken from www.calculator.net    The Power of Protein:    High Protein Foods:  FISH  (3-6 ounces/meal)  All types of fish  Seafood (shrimp, scallops, clams, mussels, lobster)  EGGS     2-3 eggs/meal  DAIRY (2/3 to 1 ½ cup)  Cottage cheese   Greek yogurt  PLANTS (½-3/4 cup/meal)  Legumes: Dried beans and peas (black beans, oconnell beans, garbanzo beans, kidney, cannellini, navy, split peas, black eyed peas)  Lentils  Quinoa  Soy (edamame, tofu)  PORK   (3-6 oz/meal)  Tenderloin  Pork chop  Top loin roast, boneless  Sirloin roast, boneless  Cuban vasquez (nitrate free)  Boiled deli ham (nitrate free)    Additional Protein Sources:  BEEF    (3-6 oz/meal)  Flank steak       Skirt steak  Bottom round(rump roast), select   Ground beef, 90% lean (ground sirloin)  Jarod eye steak, choice  Eye of round roast, choice   POULTRY  (3-6 oz/meal)  Ground chicken or turkey  Chicken, no skin  Turkey, no skin  PROTEIN SHAKES: OWYN and Koia (plant based and dairy free), Corepower by CardLab, Jennifer (plant based option available), Premier Protein, JuicePlus Complete (www.juiceplus.com)  PROTEIN BARS: RXBAR, PowerCrunch, Quest, Barebells, Mosh      Stopped Losing Weight? Fight Your Way through a Weight Plateau  March 12, 2019  Posted in Blog, Motivation  By Your  Weight Matters Campaign     You've managed your food intake, had your fair share of sweat sessions, and you're on a roll with weight-loss. Congrats! Confidence and determination have propelled you to success.  But all of a sudden, the scale stops moving and will no long budge. You haven't slacked off, so what's the deal? You might have found yourself in a frustrating weight plateau.  What's a Weight Plateau?  Plateaus happen when you stop losing weight, even if you're doing everything “right” -- cutting back on portion sizes, eating healthy foods and working out like your life depends on it.  The sudden halt in progress might seem surprising if your initial weight-loss was fast and relatively simple. However, plateaus are common and sometimes inevitable if your metabolism has declined -- usually from muscle-loss and significant changes to your body weight. This means you're burning less calories than before, even if you're doing the same thing.  Tips to Fight back  Weight plateaus can be trying, especially if your motivation was fed by seeing progress on the scale. Still, you haven't done anything wrong. You just need a new game plan!  Are You Building Muscle?  Strength training builds muscle which boosts your metabolism and burns far more calories over time than cardio. Try the “progressive overload” approach of gradually increasing stressed placed on the body during weight training. This includes slowly adding weight and reps.  Adjust Your Calorie Intake  After weight-loss, you may be burning less calories if your metabolism has decreased. So, you might also need to consume less calories. Re-examine and adjust your food behavior.  Embrace Change in Your Routine  Try a new workout in a new location, preferably something invigorating that you've never done before. Also consider some new recipes to prevent meals from getting boring. Changes to your normal routine are fresh, exciting and usually very  motivating.  Manage Stress  Stress can often put the brakes on weight-loss by triggering food cravings and releasing the hormone cortisol. Identify healthy and sustainable stress management techniques  to remain relaxed, focused and balanced in all aspects and areas of your body.  Eat More Protein  Protein burns more calories during digestion than other nutrients, and some studies show it to have fat-burning catalysts. It also keeps you full and aids in building lean muscle mass.  Ensure You're Properly Hydrated  Even when you're mildly dehydrated, your body can crave food despite a true lack in hunger. You should strive for at least  fluid ounces of water each day (even more when you exercise) and replace coffee, tea and alcoholic beverages with water when you can.  Plateaus Can be Conquered  If you've still got weight to lose that will benefit your health, don't feel discouraged in the midst of a plateau. It happens, and it's a sign that you've already made great progress!  Instead, try thinking of the plateau as your next great challenge. When you re-work your mindset and get outside your comfort zone, you'll continue to surprise yourself.      Medication use and SEs reviewed with patient.    Return in about 3 months (around 4/6/2025) for weight management via clinic as scheduled.    Patient verbalizes understanding.    DOCUMENTATION OF TIME SPENT: Code selection for this visit was based on time spent : 25 minutes on date of service in preparing to see the patient, obtaining and/or reviewing separately obtained history, performing a medically appropriate examination, counseling and educating the patient/family/caregiver, ordering medications or testing, referring and communicating with other healthcare providers, documenting clinical information in the electronic medical record, independently interpreting results and communicating results to the patient/family/caregiver and care coordination with the  patient's other providers.

## 2025-01-17 ENCOUNTER — OFFICE VISIT (OUTPATIENT)
Dept: OBGYN CLINIC | Facility: CLINIC | Age: 20
End: 2025-01-17
Payer: COMMERCIAL

## 2025-01-17 VITALS
HEART RATE: 93 BPM | BODY MASS INDEX: 42 KG/M2 | DIASTOLIC BLOOD PRESSURE: 74 MMHG | WEIGHT: 265.81 LBS | SYSTOLIC BLOOD PRESSURE: 111 MMHG

## 2025-01-17 DIAGNOSIS — N83.209 CYST OF OVARY, UNSPECIFIED LATERALITY: ICD-10-CM

## 2025-01-17 DIAGNOSIS — Z97.5 NEXPLANON IN PLACE: Primary | ICD-10-CM

## 2025-01-17 PROCEDURE — 99213 OFFICE O/P EST LOW 20 MIN: CPT | Performed by: NURSE PRACTITIONER

## 2025-01-17 PROCEDURE — 3074F SYST BP LT 130 MM HG: CPT | Performed by: NURSE PRACTITIONER

## 2025-01-17 PROCEDURE — 3078F DIAST BP <80 MM HG: CPT | Performed by: NURSE PRACTITIONER

## 2025-01-17 NOTE — PROGRESS NOTES
[facility]   Obstetrics and Gynecology    Ashley Ramirez is a 19 year old female  Patient's last menstrual period was 2025 (exact date).   Chief Complaint   Patient presents with    Gyn Problem     CT-SCAN & ultrasound FOUND CYST    . New patient  Has nexplanon - didn't get period for first year. She has irregular periods since July with nexplnaon - can last long time up to a month, then goes away.   Changes pad every hour - always had heavy & painful periods even prior to nexplanon.     She is sexually active - male partner, no concerns with intercourse. Only partner. Declines sti testing. Denies abdominal pain or abnormal discharge or odor.    She started zepbound last month. She was on wegovy for about 6 months prior. Was 305# prior to starting medication. Now today 265#. Reviewed weight changes could have caused changes in menses.    She has Ultrasound on - 4.8 cm left ovarian cyst and 2.3 cm right ovarian cyst. Then seen in ER on  for pain and no cysts seen on CT.  Pap:never  Contraception: 2023 nexplanon; condoms  OBSTETRICS HISTORY:  OB History    Para Term  AB Living   0 0 0 0 0 0   SAB IAB Ectopic Multiple Live Births   0 0 0 0 0       GYNE HISTORY:  Menarche: 8 (2025  3:03 PM)  Period Cycle (Days): IRREGUALR WITH NEXPLANON (2025  3:03 PM)  Period Duration (Days): IRREGULAR (2025  3:03 PM)  Use of Birth Control (if yes, specify type): Nexplanon (2025  3:03 PM)      History   Sexual Activity    Sexual activity: Not on file       MEDICAL HISTORY:  Past Medical History:    Extrinsic asthma, unspecified    Multiple allergies       SOCIAL HISTORY:  Social History     Socioeconomic History    Marital status: Single     Spouse name: Not on file    Number of children: Not on file    Years of education: Not on file    Highest education level: Not on file   Occupational History    Not on file   Tobacco Use    Smoking status: Never    Smokeless  tobacco: Never   Vaping Use    Vaping status: Never Used   Substance and Sexual Activity    Alcohol use: No    Drug use: Never    Sexual activity: Not on file   Other Topics Concern     Service Not Asked    Blood Transfusions Not Asked    Caffeine Concern No    Occupational Exposure Not Asked    Hobby Hazards Not Asked    Sleep Concern Not Asked    Stress Concern Not Asked    Weight Concern Not Asked    Special Diet Not Asked    Back Care Not Asked    Exercise Not Asked    Bike Helmet Not Asked    Seat Belt Not Asked    Self-Exams Not Asked   Social History Narrative    Not on file     Social Drivers of Health     Financial Resource Strain: Not on file   Food Insecurity: Not on file   Transportation Needs: Not on file   Physical Activity: Not on file   Stress: Not on file   Social Connections: Not on file   Housing Stability: Not on file       MEDICATIONS:    Current Outpatient Medications:     Phentermine HCl 37.5 MG Oral Cap, Take 1 capsule (37.5 mg total) by mouth every morning., Disp: 30 capsule, Rfl: 3    Tirzepatide-Weight Management (ZEPBOUND) 7.5 MG/0.5ML Subcutaneous Solution Auto-injector, Inject 7.5 mg into the skin once a week., Disp: 2 mL, Rfl: 0    Tirzepatide-Weight Management (ZEPBOUND) 10 MG/0.5ML Subcutaneous Solution Auto-injector, Inject 10 mg into the skin once a week. Start after completing full 4 weeks on 7.5 mg weekly dose., Disp: 2 mL, Rfl: 2    Chromium Picolinate 200 MCG Oral Tab, Take by mouth., Disp: , Rfl:     dicyclomine 20 MG Oral Tab, Take 1 tablet (20 mg total) by mouth 4 (four) times daily as needed (Abdominal pain)., Disp: 30 tablet, Rfl: 0    ALLERGIES:  Allergies[1]      Review of Systems:  Constitutional:  Denies fatigue, night sweats, hot flashes  Cardiovascular:  denies chest pain or palpitations  Respiratory:  denies shortness of breath  Gastrointestinal:  denies heartburn, abdominal pain, diarrhea or constipation  Genitourinary:  denies dysuria, incontinence,  abnormal vaginal discharge, vaginal itching   Musculoskeletal:  denies back pain.  Skin/Breast:  Denies any breast pain, lumps, or discharge.   Neurological:  denies headaches, extremity weakness or numbness.  Psychiatric: denies depression or anxiety.  Endocrine:   denies excessive thirst or urination.  Heme/Lymph:  denies history of anemia, easy bruising or bleeding.      PHYSICAL EXAM:     Vitals:    01/17/25 1505   BP: 111/74   Pulse: 93   Weight: 265 lb 12.8 oz (120.6 kg)     Body mass index is 41.63 kg/m².     Patient offered chaperone, patient declined.     Constitutional: well developed, well nourished    Psychiatric:  Oriented to time, place, person and situation. Appropriate mood and affect      Assessment & Plan:  Ashley was seen today for gyn problem.    Diagnoses and all orders for this visit:    Nexplanon in place    Cyst of ovary, unspecified laterality    Has nexplanon  Had ovarian cyst in November, not seen on CT in December    Reviewed irregular bleeding common with nexplanon but also can be common with weight changes.  Discussed changing to pills or depo provera to help with cysts and painful heavy periods.  Will consider, desires to continue with nexplanon.      ODIN Seymour    This note was prepared using Dragon Medical voice recognition dictation software. As a result errors may occur. When identified these errors have been corrected. While every attempt is made to correct errors during dictation discrepancies may still exist.         [1]   Allergies  Allergen Reactions    Coconut Fatty Acids FACE FLUSHING

## 2025-01-19 DIAGNOSIS — E66.813 CLASS 3 SEVERE OBESITY WITH SERIOUS COMORBIDITY AND BODY MASS INDEX (BMI) OF 45.0 TO 49.9 IN ADULT, UNSPECIFIED OBESITY TYPE (HCC): ICD-10-CM

## 2025-01-19 DIAGNOSIS — Z51.81 ENCOUNTER FOR THERAPEUTIC DRUG MONITORING: ICD-10-CM

## 2025-01-19 DIAGNOSIS — E66.01 CLASS 3 SEVERE OBESITY WITH SERIOUS COMORBIDITY AND BODY MASS INDEX (BMI) OF 45.0 TO 49.9 IN ADULT, UNSPECIFIED OBESITY TYPE (HCC): ICD-10-CM

## 2025-01-19 DIAGNOSIS — R73.03 PREDIABETES: ICD-10-CM

## 2025-01-20 ENCOUNTER — TELEPHONE (OUTPATIENT)
Dept: FAMILY MEDICINE CLINIC | Facility: CLINIC | Age: 20
End: 2025-01-20

## 2025-01-20 RX ORDER — PHENTERMINE HYDROCHLORIDE 37.5 MG/1
37.5 CAPSULE ORAL EVERY MORNING
Qty: 30 CAPSULE | Refills: 3 | OUTPATIENT
Start: 2025-01-20

## 2025-01-20 RX ORDER — PANTOPRAZOLE SODIUM 40 MG/1
40 TABLET, DELAYED RELEASE ORAL DAILY
Qty: 90 TABLET | Refills: 0 | OUTPATIENT
Start: 2025-01-20

## 2025-01-20 NOTE — TELEPHONE ENCOUNTER
Reviewed GI notes  She was to take this medication for 6 weeks and if symptoms persist to follow up with gastroenterology

## 2025-01-20 NOTE — TELEPHONE ENCOUNTER
The patient calling back stating she received her Pantoprazole 40 mg from a [facility] clinic Rubén Soler nurse practitioner.  The patient stated continues to have the acid reflux. She stated when she takes the Pantoprazole she feels better.  She went to see Gastroenterology on 10/17/24 and was informed to continue to take.  She saw Dr. Smith on 10/30/24    The patient is asking Dr. Smith to refill the Pantoprazole.  She only has 4 pills left.    I pended the pantoprazole.   Listed as historical.      January 19, 2025  Ashley Ramirez to P Em Rn Triage (supporting Marci Smith MD)         1/19/25  1:55 PM  Good afternoon, I was wondering if you could possibly send an order to my pharmacy to get a prescription refilled the doctor I got it from is no longer available and my pharmacy told me to contact you. The medicine is called pantoprazole , the reason I would like it refilled is because I’m still having some acid reflux. Thank you.

## 2025-01-25 ENCOUNTER — PATIENT MESSAGE (OUTPATIENT)
Dept: INTERNAL MEDICINE CLINIC | Facility: CLINIC | Age: 20
End: 2025-01-25

## 2025-01-25 DIAGNOSIS — E66.813 CLASS 3 SEVERE OBESITY WITH SERIOUS COMORBIDITY AND BODY MASS INDEX (BMI) OF 45.0 TO 49.9 IN ADULT, UNSPECIFIED OBESITY TYPE (HCC): ICD-10-CM

## 2025-01-25 DIAGNOSIS — E66.01 CLASS 3 SEVERE OBESITY WITH SERIOUS COMORBIDITY AND BODY MASS INDEX (BMI) OF 45.0 TO 49.9 IN ADULT, UNSPECIFIED OBESITY TYPE (HCC): ICD-10-CM

## 2025-01-25 DIAGNOSIS — R73.03 PREDIABETES: ICD-10-CM

## 2025-01-25 DIAGNOSIS — Z51.81 ENCOUNTER FOR THERAPEUTIC DRUG MONITORING: Primary | ICD-10-CM

## 2025-01-27 RX ORDER — TIRZEPATIDE 5 MG/.5ML
5 INJECTION, SOLUTION SUBCUTANEOUS WEEKLY
Qty: 2 ML | Refills: 2 | Status: SHIPPED | OUTPATIENT
Start: 2025-01-27

## 2025-01-27 NOTE — H&P
[facility] - Gastroenterology                                                                                                               Reason for consult: eval    Requesting physician or provider: Marci Smith MD    Chief Complaint   Patient presents with    Follow - Up       HPI:   Ashley Ramirez is a 19 year old year-old female with history of asthma, allergies:    she is here today for f/U  Last seen 10/27/24  Gerd  Switched from wegovy to zepbound  Pantoprazole hasn't been working as well  Has been more constipated    No bm x last week. Is passing gas. she denies brbpr and/or melena.  Had darker stool last week. No sob, dizziness, fatigue.     she denies acid reflux and/or heartburn. she denies dysphagia, odynophagia and/or globus. Has sharp abd pain at random.  Has  had nausea. Vomiting x 2. Emesis clear liquid.  Appetite less with therapy. Has been losing weight.     Ct a/p 12/2024    Impression   CONCLUSION:  1. Intraluminal fluid throughout the gastrointestinal tract, which is nonspecific but can be seen with gastroenteritis or underlying malabsorption.  2. Otherwise no acute intra-abdominal process.  Normal appendix.  3. Small hiatal hernia.     Had constipation  Wbc, plt up - discharged with dx viral enteritis    NSAIDS: no  Tobacco: no  Alcohol: no  Marijuana: no  Illicit drugs: no     No FH GI malignancy, IBD. celiac     No history of adverse reaction to sedation  No IRENA  No anticoagulants  No pacemaker/defibrillator  No pain medications and/or sleep aides        Last colonoscopy: no  Last EGD: no    Wt Readings from Last 6 Encounters:   01/29/25 263 lb (119.3 kg) (>99%, Z= 2.60)*   01/17/25 265 lb 12.8 oz (120.6 kg) (>99%, Z= 2.62)*   01/06/25 266 lb (120.7 kg) (>99%, Z= 2.62)*   12/29/24 265 lb (120.2 kg) (>99%, Z= 2.61)*   11/07/24 267 lb (121.1 kg) (>99%, Z= 2.62)*   10/30/24 260 lb (117.9  kg) (>99%, Z= 2.57)*     * Growth percentiles are based on CDC (Girls, 2-20 Years) data.        History, Medications, Allergies, ROS:      Past Medical History:    Extrinsic asthma, unspecified    Multiple allergies      History reviewed. No pertinent surgical history.   Family Hx:   Family History   Problem Relation Age of Onset    Asthma Father     Hypertension Father     Lipids Mother       Social History:   Social History     Socioeconomic History    Marital status: Single   Tobacco Use    Smoking status: Never    Smokeless tobacco: Never   Vaping Use    Vaping status: Never Used   Substance and Sexual Activity    Alcohol use: No    Drug use: Never   Other Topics Concern    Caffeine Concern No        Medications (Active prior to today's visit):  Current Outpatient Medications   Medication Sig Dispense Refill    ondansetron 4 MG Oral Tablet Dispersible Place 1 Ring vaginally every 28 days.      mometasone furoate 50 MCG/ACT Nasal Suspension 2 sprays by Nasal route daily.      albuterol 108 (90 Base) MCG/ACT Inhalation Aero Soln Inhale 2 puffs into the lungs.      acetaminophen 500 MG Oral Tab Take 1-2 tablets (500-1,000 mg total) by mouth.      amoxicillin clavulanate 875-125 MG Oral Tab Take 1 tablet by mouth 2 (two) times daily.      Etonogestrel (NEXPLANON) 68 MG Subcutaneous Implant Inject into the skin.      Omeprazole 40 MG Oral Capsule Delayed Release Take 1 capsule (40 mg total) by mouth daily. Take 1 capsule by mouth daily before breakfast. 30 capsule 3    PEG 3350-KCl-Na Bicarb-NaCl (TRILYTE) 420 g Oral Recon Soln Take prep as directed by gastro office. May substitute with Trilyte/generic equivalent if needed. 4000 mL 0    polyethylene glycol, PEG 3350, (MIRALAX) 17 GM/SCOOP Oral Powder Take 17 g by mouth daily. 238 g 1    Tirzepatide-Weight Management (ZEPBOUND) 2.5 MG/0.5ML Subcutaneous Solution Auto-injector Inject 2.5 mg into the skin once a week. Dx: Obesity with baseline BMI 45.09 2 mL 0     Phentermine HCl 37.5 MG Oral Cap Take 1 capsule (37.5 mg total) by mouth every morning. 30 capsule 3    Chromium Picolinate 200 MCG Oral Tab Take by mouth.      dicyclomine 20 MG Oral Tab Take 1 tablet (20 mg total) by mouth 4 (four) times daily as needed (Abdominal pain). 30 tablet 0    Tirzepatide-Weight Management (ZEPBOUND) 5 MG/0.5ML Subcutaneous Solution Auto-injector Inject 5 mg into the skin once a week. 2 mL 2       Allergies:  Allergies[1]    ROS:   CONSTITUTIONAL: negative for fevers, chills, sweats and weight loss  EYES Negative for red eyes, yellow eyes, changes in vision  HEENT: Negative for dysphagia and hoarseness  RESPIRATORY: Negative for cough and shortness of breath  CARDIOVASCULAR: Negative for chest pain, palpitations  GASTROINTESTINAL: See HPI  GENITOURINARY: Negative for dysuria and frequency  MUSCULOSKELETAL: Negative for arthralgias and myalgias  NEUROLOGICAL: Negative for dizziness and headaches  BEHAVIOR/PSYCH: Negative for anxiety and poor appetite    PHYSICAL EXAM:   Blood pressure 105/69, pulse 87, height 5' 7\" (1.702 m), weight 263 lb (119.3 kg), last menstrual period 01/14/2025.    GEN: WD/WN, NAD  HEENT: Supple symmetrical, trachea midline  CV: RRR, the extremities are warm and well perfused   LUNGS: No increased work of breathing  ABDOMEN: No scars, normal bowel sounds, soft, non-tender, non-distended no rebound or guarding, no masses, no hepatomegaly  MSK: No redness, no warmth, no swelling of joints  SKIN: No jaundice, no erythema, no rashes  HEMATOLOGIC: No bleeding, no bruising  NEURO: Alert and interactive, normal gait    Labs/Imaging/Procedures:     Patient's pertinent labs and imaging were reviewed and discussed with patient today.        .  ASSESSMENT/PLAN:   Ashley Ramirez is a 19 year old year-old female with history of asthma, allergies:    #abnormal ct  #constipation  #ad pain  #gerd  #n/v  She is here today for fu. Sx progressive since last visit.  GLP-1  therapy changed since last visit. Was in ed 12/2024 - imaging w/ possible ge vs malabsorption. She denies diarrhea. Not anemic. Noted wbc, plt up in ed - likely 2/2 ge. Pantoprazole no longer working. She denies brbpr. Has had vomiting- emesis liquid. Normal gb on ct. Think may be 2/2 glp-1 therapy. No fhx gi malignancy, IBD, celiac. Plan as below.     -dulcolax suppository  -miralax 2 capfuls in am  -milk of magnesia at night   -reflux diet modification  -stop pantoprazole and switch to omeprazole  -contact prescribed about stopping or lowering glp-1 dose  -er if condition decline    1. Schedule colonoscopy/egd with MAC w/ General pool MD [Diagnosis:#abnormal ct  #constipation  #ad pain  #gerd  #n/v ]    2.  bowel prep from pharmacy (split trilyte )    3. Hold phentermine and glp-1 theapy 7 days prior to procedure  For cardiology patients and patients on blood thinners:  Please contact your cardiology clinic for clearance to proceed with the endoscopic procedure. If you are on blood thinners, please also confirm with your cardiologic clinic that you are able to hold the blood thinner per our recommendations.\"    BLOOD THINNER ORDERS:  -Hold for 48 hours (Xarelto, Eliquis, Pradaxa, Savaysa)  -Hold for 3 days (Pletal)  -Hold for 5 days (Coumadin, Plavix, Brilinta, Aggrenox)  -Hold for 7 days (Effient)     For endocrinology insulin patients:    Please contact your endocrinology clinic for insulin adjustment orders prior to your endoscopic procedure.    4. Read all bowel prep instructions carefully    5. AVOID seeds, nuts, popcorn, raw fruits and vegetables (cooked is okay) for 2-3 days before procedure    6.   If you start any NEW medication after your visit today, please notify us. Certain medications will need to be held before the procedure, or the procedure cannot be performed.     >>>Please note: if you were prescribed Suprep for the bowel prep and it is too expensive or not covered by insurance, it is  okay to substitute Trilyte (or any similar generic prep). This can be done by notifying the pharmacy or calling our office.     Orders This Visit:  No orders of the defined types were placed in this encounter.      Meds This Visit:  Requested Prescriptions     Signed Prescriptions Disp Refills    Omeprazole 40 MG Oral Capsule Delayed Release 30 capsule 3     Sig: Take 1 capsule (40 mg total) by mouth daily. Take 1 capsule by mouth daily before breakfast.    PEG 3350-KCl-Na Bicarb-NaCl (TRILYTE) 420 g Oral Recon Soln 4000 mL 0     Sig: Take prep as directed by gastro office. May substitute with Trilyte/generic equivalent if needed.    polyethylene glycol, PEG 3350, (MIRALAX) 17 GM/SCOOP Oral Powder 238 g 1     Sig: Take 17 g by mouth daily.       Imaging & Referrals:  None    ENDOSCOPIC RISK BENEFIT DISCUSSION: I described the procedure in great detail with the patient. I discussed the risks and benefits, including but not limited to: bleeding, perforation, infection, anesthesia complications, and even death. Patient will be NPO after midnight and will have a person physically present at time of pick-up to drive patient home. Patient verbalized understanding and agrees to proceed with procedure as planned.    Edna Duran, APRN   1/27/2025        This note was partially prepared using Dragon Medical voice recognition dictation software. As a result, errors may occur. When identified, these errors have been corrected. While every attempt is made to correct errors during dictation, discrepancies may still exist.          [1]   Allergies  Allergen Reactions    Coconut Fatty Acids FACE FLUSHING

## 2025-01-28 ENCOUNTER — MED REC SCAN ONLY (OUTPATIENT)
Dept: FAMILY MEDICINE CLINIC | Facility: CLINIC | Age: 20
End: 2025-01-28

## 2025-01-29 ENCOUNTER — TELEPHONE (OUTPATIENT)
Dept: GASTROENTEROLOGY | Facility: CLINIC | Age: 20
End: 2025-01-29

## 2025-01-29 ENCOUNTER — OFFICE VISIT (OUTPATIENT)
Dept: GASTROENTEROLOGY | Facility: CLINIC | Age: 20
End: 2025-01-29
Payer: COMMERCIAL

## 2025-01-29 VITALS
WEIGHT: 263 LBS | DIASTOLIC BLOOD PRESSURE: 69 MMHG | HEIGHT: 67 IN | HEART RATE: 87 BPM | BODY MASS INDEX: 41.28 KG/M2 | SYSTOLIC BLOOD PRESSURE: 105 MMHG

## 2025-01-29 DIAGNOSIS — R93.89 ABNORMAL CT SCAN: ICD-10-CM

## 2025-01-29 DIAGNOSIS — R10.84 GENERALIZED ABDOMINAL PAIN: ICD-10-CM

## 2025-01-29 DIAGNOSIS — R93.89 ABNORMAL CT SCAN: Primary | ICD-10-CM

## 2025-01-29 DIAGNOSIS — R11.2 NAUSEA AND VOMITING, UNSPECIFIED VOMITING TYPE: ICD-10-CM

## 2025-01-29 DIAGNOSIS — K21.9 GASTROESOPHAGEAL REFLUX DISEASE, UNSPECIFIED WHETHER ESOPHAGITIS PRESENT: Primary | ICD-10-CM

## 2025-01-29 DIAGNOSIS — K21.9 GASTROESOPHAGEAL REFLUX DISEASE, UNSPECIFIED WHETHER ESOPHAGITIS PRESENT: ICD-10-CM

## 2025-01-29 DIAGNOSIS — K59.00 CONSTIPATION, UNSPECIFIED CONSTIPATION TYPE: ICD-10-CM

## 2025-01-29 PROCEDURE — 3074F SYST BP LT 130 MM HG: CPT | Performed by: NURSE PRACTITIONER

## 2025-01-29 PROCEDURE — 99215 OFFICE O/P EST HI 40 MIN: CPT | Performed by: NURSE PRACTITIONER

## 2025-01-29 PROCEDURE — 3008F BODY MASS INDEX DOCD: CPT | Performed by: NURSE PRACTITIONER

## 2025-01-29 PROCEDURE — 3078F DIAST BP <80 MM HG: CPT | Performed by: NURSE PRACTITIONER

## 2025-01-29 RX ORDER — OMEPRAZOLE 40 MG/1
40 CAPSULE, DELAYED RELEASE ORAL DAILY
Qty: 30 CAPSULE | Refills: 3 | Status: SHIPPED | OUTPATIENT
Start: 2025-01-29

## 2025-01-29 RX ORDER — MOMETASONE FUROATE MONOHYDRATE 50 UG/1
2 SPRAY, METERED NASAL DAILY
COMMUNITY
Start: 2025-01-25 | End: 2025-02-24

## 2025-01-29 RX ORDER — POLYETHYLENE GLYCOL 3350 17 G/17G
17 POWDER, FOR SOLUTION ORAL DAILY
Qty: 238 G | Refills: 1 | Status: SHIPPED | OUTPATIENT
Start: 2025-01-29

## 2025-01-29 RX ORDER — ALBUTEROL SULFATE 90 UG/1
2 INHALANT RESPIRATORY (INHALATION)
COMMUNITY
Start: 2025-01-25 | End: 2025-02-24

## 2025-01-29 RX ORDER — POLYETHYLENE GLYCOL 3350, SODIUM CHLORIDE, SODIUM BICARBONATE, POTASSIUM CHLORIDE 420; 11.2; 5.72; 1.48 G/4L; G/4L; G/4L; G/4L
POWDER, FOR SOLUTION ORAL
Qty: 4000 ML | Refills: 0 | Status: SHIPPED | OUTPATIENT
Start: 2025-01-29

## 2025-01-29 RX ORDER — ONDANSETRON 4 MG/1
1 TABLET, ORALLY DISINTEGRATING ORAL
COMMUNITY
Start: 2025-01-19

## 2025-01-29 RX ORDER — ETONOGESTREL 68 MG/1
IMPLANT SUBCUTANEOUS
COMMUNITY

## 2025-01-29 RX ORDER — ACETAMINOPHEN 500 MG
TABLET ORAL
COMMUNITY
Start: 2025-01-25 | End: 2025-02-01

## 2025-01-29 NOTE — TELEPHONE ENCOUNTER
Scheduled for:  Colonoscopy 85807/Esophagogastroduodenoscopy 15157    Provider Name:   Alexandru    Date:  2/18/2025    Location:    [facility]    Sedation:  MAC    Time:  945 am (Patient made aware EOS will call the day before with procedure/arrival time)    Prep:  Trilyte    Meds/Allergies Reconciled?:  Physician reviewed     Diagnosis with codes:    Abnormal CT scan [R93.89]  Constipation, unspecified constipation type [K59.00]  Generalized abdominal pain [R10.84]  Gastroesophageal reflux disease, unspecified whether esophagitis present [K21.9]  Nausea and vomiting, unspecified vomiting type [R11.2]       Was patient informed to call insurance with codes (Y/N):  Yes, I confirmed [facility] insurance with the patient.     Referral sent?:  N/A    EM or [facility] notified?:  I sent an electronic request to Endo Scheduling and received a confirmation today.      Medication Orders:  This patient verbally confirmed that she is not taking:   Iron, blood thinners, BP meds, and is not diabetic   No latex allergy, No PCN allergy and does not have a pacemaker     Misc Orders:    Hold Phentermine 7 days prior to procedure  Hold Zepbound 7 days prior to procedure     Further instructions given by staff:   I discussed the prep instructions with the patient which ** verbally understood and is aware that I will send the instructions today via [employer].    Advised patient:    You will not be able to drive, operate machinery or make critical decisions the day of your procedure. Please make arrangements for transportation. You must have a  (age 18 or older) to accompany you, stay in the facility for the duration of your procedure and drive you home after the procedure.  You cannot use public transportation (Uber, Lyft, Taxi). The procedure involves sedation, and you will not be allowed to leave unaccompanied. Your procedure will not proceed forward if you're unable to confirm your  planned to escort you home.    Advised Patient:    [facility]  requires payment of copay and any patient responsibility at the time of registration.   The [facility] requires copay and 50% of the patient responsibility at the time of service for all Esophagogastroduodenoscopy and diagnostic Colonoscopies.     They do offer payment plans and Care Credit options if unable to pay the full amount at the time of registration.     If you have any questions regarding your potential responsibility, please contact [facility] Insurance Department at [phone] option 1.    You may receive 4 bills related to your medical procedure:   [facility] (the facility)  The procedural physician  The anesthesiologist  The pathology lab (if applicable)

## 2025-01-29 NOTE — PATIENT INSTRUCTIONS
-dulcolax suppository  -miralax 2 capfuls in am  -milk of magnesia at night   -reflux diet modification  -stop pantoprazole and switch to omeprazole  -contact prescribed about stopping or lowering glp-1 dose  -er if condition decline    1. Schedule colonoscopy/egd with MAC w/ General pool MD [Diagnosis:#abnormal ct  #constipation  #ad pain  #gerd  #n/v ]    2.  bowel prep from pharmacy (split trilyte )    3. Hold phentermine and glp-1 theapy 7 days prior to procedure  For cardiology patients and patients on blood thinners:  Please contact your cardiology clinic for clearance to proceed with the endoscopic procedure. If you are on blood thinners, please also confirm with your cardiologic clinic that you are able to hold the blood thinner per our recommendations.\"    BLOOD THINNER ORDERS:  -Hold for 48 hours (Xarelto, Eliquis, Pradaxa, Savaysa)  -Hold for 3 days (Pletal)  -Hold for 5 days (Coumadin, Plavix, Brilinta, Aggrenox)  -Hold for 7 days (Effient)     For endocrinology insulin patients:    Please contact your endocrinology clinic for insulin adjustment orders prior to your endoscopic procedure.    4. Read all bowel prep instructions carefully    5. AVOID seeds, nuts, popcorn, raw fruits and vegetables (cooked is okay) for 2-3 days before procedure    6.   If you start any NEW medication after your visit today, please notify us. Certain medications will need to be held before the procedure, or the procedure cannot be performed.     >>>Please note: if you were prescribed Suprep for the bowel prep and it is too expensive or not covered by insurance, it is okay to substitute Trilyte (or any similar generic prep). This can be done by notifying the pharmacy or calling our office.     Tips to Control Acid Reflux    To control acid reflux, you’ll need to make some basic diet and lifestyle changes. The simple steps outlined below may be all you’ll need to ease discomfort.   Watch what you eat  Don't have fatty  foods or spicy foods.  Eat fewer acidic foods, such as citrus and tomato-based foods. These can increase symptoms.  Limit drinking alcohol, caffeine, and fizzy beverages. All increase acid reflux.  Try limiting chocolate, peppermint, and spearmint. These can make acid reflux worse in some people.     Watch when you eat  Don't lie down for 3 hours after eating.  Don't snack before going to bed.     Raise your head  Raising your head and upper body by 4 to 6 inches helps limit reflux when you’re lying down. Put blocks under the head of your bed frame or a wedge under your mattress to raise it.   Other changes  Lose weight, if you need to  Don’t exercise near bedtime  Don't wear tight-fitting clothes  Limit aspirin and ibuprofen  Stop smoking     [employer] last reviewed this educational content on 6/1/2019  © 8408-3103 [employer]. [address] [place] [address], [place] [address]. All rights reserved. This information is not intended as a substitute for professional medical care. Always follow your healthcare professional's instructions.

## 2025-01-29 NOTE — TELEPHONE ENCOUNTER
Schedulers, see providers orders below:     -Patient was seen in office today with Edna and was provided with written and verbal procedure prep instructions, including any medication adjustments.   -Patient was advised to call medical insurance for any questions on benefits and/or any out of pocket costs.   -Patient is aware that the GI schedulers will be calling to schedule procedure(s).            Instructions and Information about Your Health    1. Schedule colonoscopy/egd with MAC w/ General pool MD [Diagnosis:#abnormal ct  #constipation  #ad pain  #gerd  #n/v ]     2.  bowel prep from pharmacy ([facility] trilyte )     3. Hold phentermine and glp-1 theapy 7 days prior to procedure  For cardiology patients and patients on blood thinners:  Please contact your cardiology clinic for clearance to proceed with the endoscopic procedure. If you are on blood thinners, please also confirm with your cardiologic clinic that you are able to hold the blood thinner per our recommendations.\"     BLOOD THINNER ORDERS:  -Hold for 48 hours (Xarelto, Eliquis, Pradaxa, Savaysa)  -Hold for 3 days (Pletal)  -Hold for 5 days (Coumadin, Plavix, Brilinta, Aggrenox)  -Hold for 7 days (Effient)      For endocrinology insulin patients:     Please contact your endocrinology clinic for insulin adjustment orders prior to your endoscopic procedure.     4. Read all bowel prep instructions carefully     5. AVOID seeds, nuts, popcorn, raw fruits and vegetables (cooked is okay) for 2-3 days before procedure     6.   If you start any NEW medication after your visit today, please notify us. Certain medications will need to be held before the procedure, or the procedure cannot be performed.

## 2025-02-18 PROBLEM — R93.89 ABNORMAL CT SCAN: Status: ACTIVE | Noted: 2025-02-18

## 2025-02-18 PROCEDURE — 88305 TISSUE EXAM BY PATHOLOGIST: CPT | Performed by: STUDENT IN AN ORGANIZED HEALTH CARE EDUCATION/TRAINING PROGRAM

## 2025-02-21 ENCOUNTER — TELEPHONE (OUTPATIENT)
Facility: CLINIC | Age: 20
End: 2025-02-21

## 2025-02-21 DIAGNOSIS — R10.9 ABDOMINAL PAIN, UNSPECIFIED ABDOMINAL LOCATION: Primary | ICD-10-CM

## 2025-02-21 NOTE — TELEPHONE ENCOUNTER
Scheduled for:  EGD 94707   Provider Name:  Dr. Horvath   Date:  5/21/2025  Location:    [facility]  Sedation:  MAC  Time:  8:30 (pt is aware that ENDO will call the day before to confirm arrival time)  Prep:  NPO after midnight   Meds/Allergies Reconciled?:  Physician reviewed   Diagnosis with codes:  Abdominal Pain R10.9  Was patient informed to call insurance with codes (Y/N):  Yes, I confirmed [employer] insurance with the patient.   Referral sent?:  Referral was sent at the time of electronic surgical scheduling.  EM or [facility] notified?:  I sent an electronic request to Endo Scheduling and received a confirmation today.   Medication Orders:  This patient verbally confirmed that he is not taking:   Iron, blood thinners, BP meds, and is not diabetic   Not latex allergy, Not PCN allergy and does not have a pacemaker  Misc Orders:     I discussed the prep instructions with the patient which he verbally understood and is aware that I will mychart the instructions today.    Further instructions given by staff:     Patient aware to hold phentermine and zepbound for 7 days before the procedure

## 2025-02-21 NOTE — TELEPHONE ENCOUNTER
GI Staff:    Had to abort upper endoscopy due to issues with anesthesia.    Please reschedule patient to be at the hospital for an EGD with MAC (diagnosis: abdominal pain).    Thank you.

## 2025-03-20 ENCOUNTER — OFFICE VISIT (OUTPATIENT)
Dept: FAMILY MEDICINE CLINIC | Facility: CLINIC | Age: 20
End: 2025-03-20
Payer: COMMERCIAL

## 2025-03-20 VITALS
WEIGHT: 274 LBS | HEIGHT: 67 IN | DIASTOLIC BLOOD PRESSURE: 84 MMHG | HEART RATE: 105 BPM | BODY MASS INDEX: 43 KG/M2 | SYSTOLIC BLOOD PRESSURE: 121 MMHG | TEMPERATURE: 98 F

## 2025-03-20 DIAGNOSIS — H92.03 DISCOMFORT OF BOTH EARS: Primary | ICD-10-CM

## 2025-03-20 DIAGNOSIS — G44.209 TENSION HEADACHE: ICD-10-CM

## 2025-03-20 DIAGNOSIS — F43.9 STRESS: ICD-10-CM

## 2025-03-20 PROCEDURE — 3008F BODY MASS INDEX DOCD: CPT | Performed by: FAMILY MEDICINE

## 2025-03-20 PROCEDURE — 3079F DIAST BP 80-89 MM HG: CPT | Performed by: FAMILY MEDICINE

## 2025-03-20 PROCEDURE — 3074F SYST BP LT 130 MM HG: CPT | Performed by: FAMILY MEDICINE

## 2025-03-20 PROCEDURE — 99214 OFFICE O/P EST MOD 30 MIN: CPT | Performed by: FAMILY MEDICINE

## 2025-03-20 RX ORDER — NEOMYCIN SULFATE, POLYMYXIN B SULFATE AND HYDROCORTISONE 10; 3.5; 1 MG/ML; MG/ML; [USP'U]/ML
3 SUSPENSION/ DROPS AURICULAR (OTIC) 4 TIMES DAILY
Qty: 1 EACH | Refills: 0 | Status: SHIPPED | OUTPATIENT
Start: 2025-03-20 | End: 2025-03-27

## 2025-03-20 RX ORDER — MOMETASONE FUROATE MONOHYDRATE 50 UG/1
2 SPRAY, METERED NASAL DAILY
COMMUNITY
Start: 2025-02-21

## 2025-03-20 NOTE — PROGRESS NOTES
HPI:    Patient ID: Ashley Ramirez is a 19 year old female.      HPI    Chief Complaint   Patient presents with    Ear Pain     RT ear pain was sick in January thinks she might have an ear infection     Stress     Causes her headaches        Wt Readings from Last 6 Encounters:   03/20/25 274 lb (124.3 kg) (>99%, Z= 2.69)*   02/06/25 263 lb (119.3 kg) (>99%, Z= 2.60)*   01/29/25 263 lb (119.3 kg) (>99%, Z= 2.60)*   01/17/25 265 lb 12.8 oz (120.6 kg) (>99%, Z= 2.62)*   01/06/25 266 lb (120.7 kg) (>99%, Z= 2.62)*   12/29/24 265 lb (120.2 kg) (>99%, Z= 2.61)*     * Growth percentiles are based on [facility] (Girls, 2-20 Years) data.     BP Readings from Last 3 Encounters:   03/20/25 121/84   02/18/25 110/63   01/29/25 105/69     Patient here with complains of having pain and discomfort in both ears.  Patient states she was seen in January at [facility] clinic and was given oral antibiotics but did not help.  Denies any ear discharge.    No sore throat.  No fevers.    She also has a lot of stress she states.  She is working with a therapist and sees a therapist every 2 weeks.  She complains of pain that feels like a bandlike around her head.  She does not want to start any medications.  She has trouble sleeping at night and usually falls asleep around 1 in the morning.  She has to get up at 4 in the morning for work.    Denies any self-harm thoughts.  No visual changes.  No numbness tingling or weakness in lower or upper extremities.      Review of Systems   Constitutional:  Negative for chills and fever.   HENT:  Positive for ear pain. Negative for congestion, ear discharge, rhinorrhea, sinus pressure, sinus pain, sneezing and sore throat.    Respiratory:  Negative for cough, shortness of breath and wheezing.    Cardiovascular:  Negative for chest pain, palpitations and leg swelling.   Psychiatric/Behavioral:  Positive for behavioral problems and sleep disturbance. Negative for confusion, decreased concentration,  dysphoric mood, hallucinations, self-injury and suicidal ideas. The patient is not nervous/anxious and is not hyperactive.        /84   Pulse 105   Temp 97.8 °F (36.6 °C) (Temporal)   Ht 5' 7\" (1.702 m)   Wt 274 lb (124.3 kg)   LMP 03/15/2025 (Exact Date)   BMI 42.91 kg/m²          Current Outpatient Medications   Medication Sig Dispense Refill    mometasone furoate 50 MCG/ACT Nasal Suspension 2 sprays by Nasal route daily.      neomycin-polymyxin-hydrocortisone 3.5-17250-4 Otic Suspension Place 3 drops into both ears 4 (four) times daily for 7 days. 1 each 0    Etonogestrel (NEXPLANON) 68 MG Subcutaneous Implant Inject into the skin.      Omeprazole 40 MG Oral Capsule Delayed Release Take 1 capsule (40 mg total) by mouth daily. Take 1 capsule by mouth daily before breakfast. 30 capsule 3    Phentermine HCl 37.5 MG Oral Cap Take 1 capsule (37.5 mg total) by mouth every morning. 30 capsule 3    ondansetron 4 MG Oral Tablet Dispersible Place 1 Ring vaginally every 28 days. (Patient not taking: Reported on 3/20/2025)      PEG 3350-KCl-Na Bicarb-NaCl (TRILYTE) 420 g Oral Recon Soln Take prep as directed by gastro office. May substitute with Trilyte/generic equivalent if needed. (Patient not taking: Reported on 3/20/2025) 4000 mL 0    polyethylene glycol, PEG 3350, (MIRALAX) 17 GM/SCOOP Oral Powder Take 17 g by mouth daily. (Patient not taking: Reported on 3/20/2025) 238 g 1    Chromium Picolinate 200 MCG Oral Tab Take by mouth. (Patient not taking: Reported on 3/20/2025)      dicyclomine 20 MG Oral Tab Take 1 tablet (20 mg total) by mouth 4 (four) times daily as needed (Abdominal pain). (Patient not taking: Reported on 3/20/2025) 30 tablet 0     Allergies:Allergies[1]   PHYSICAL EXAM:     Chief Complaint   Patient presents with    Ear Pain     RT ear pain was sick in January thinks she might have an ear infection     Stress     Causes her headaches       Physical Exam  Vitals reviewed.   HENT:      Right  Ear: Ear canal normal.      Left Ear: Ear canal normal.      Ears:      Comments: Slight crusting in ear canals  Cardiovascular:      Rate and Rhythm: Normal rate and regular rhythm.      Pulses: Normal pulses.      Heart sounds: Normal heart sounds.   Musculoskeletal:      Cervical back: Normal range of motion and neck supple.   Lymphadenopathy:      Cervical: No cervical adenopathy.   Neurological:      Mental Status: She is alert and oriented to person, place, and time.      Cranial Nerves: Cranial nerves 2-12 are intact.      Sensory: Sensation is intact.      Motor: Motor function is intact.      Coordination: Coordination is intact.   Psychiatric:         Attention and Perception: Attention normal.         Speech: Speech normal.         Behavior: Behavior normal.         Thought Content: Thought content normal.         Cognition and Memory: Cognition and memory normal.                ASSESSMENT/PLAN:     Encounter Diagnoses   Name Primary?    Discomfort of both ears Yes    Stress     Tension headache        1. Discomfort of both ears  Suspect mild case of otitis externa.  Recommend keeping ears dry.  Use drops as directed.  - neomycin-polymyxin-hydrocortisone 3.5-94540-7 Otic Suspension; Place 3 drops into both ears 4 (four) times daily for 7 days.  Dispense: 1 each; Refill: 0    2. Stress  Recommend continue follow-up with therapist.  Discussed medication options with patient is not interested at this time.  Consider seeing psychiatry.    3. Tension headache  Discussed stress management techniques.  Reviewed good sleep schedule to help with sleep improvement  Discussed headache likely due to tension and stress.  No alarming factors.  Headache usually occurs when stressed.  Patient will continue with therapy but would like to hold off on medications at this time.  Encourage exercise.      No orders of the defined types were placed in this encounter.        The above note was creating using Dragon speech  recognition technology. Please excuse any typos    Meds This Visit:  Requested Prescriptions     Signed Prescriptions Disp Refills    neomycin-polymyxin-hydrocortisone 3.5-63656-7 Otic Suspension 1 each 0     Sig: Place 3 drops into both ears 4 (four) times daily for 7 days.       Imaging & Referrals:  None       ID#[id number]         [1]   Allergies  Allergen Reactions    Coconut Fatty Acids FACE FLUSHING

## 2025-04-10 RX ORDER — OMEPRAZOLE 40 MG/1
40 CAPSULE, DELAYED RELEASE ORAL DAILY
Qty: 30 CAPSULE | Refills: 3 | Status: SHIPPED | OUTPATIENT
Start: 2025-04-10

## 2025-04-10 NOTE — TELEPHONE ENCOUNTER
Requested Prescriptions     Pending Prescriptions Disp Refills    Omeprazole 40 MG Oral Capsule Delayed Release 30 capsule 3     Sig: Take 1 capsule (40 mg total) by mouth daily. Take 1 capsule by mouth daily before breakfast.     LOV: 10/17/2024  Last Refill: 01/29/2025

## 2025-05-09 ENCOUNTER — TELEPHONE (OUTPATIENT)
Facility: CLINIC | Age: 20
End: 2025-05-09

## 2025-05-09 NOTE — TELEPHONE ENCOUNTER
I spoke to the pt     I told her to call back in about one week    The prior authorization for EGD is still pending

## 2025-05-09 NOTE — TELEPHONE ENCOUNTER
Patient is calling in regards to 5/21/25 Esophagogastroduodenoscopy .  She states that she wants to confirm that insurance was contacted to find out if a Prior Authorization is required and if it was process.  She states the last time she received a bill from Radiologist because they were not covered for some reason.  Please call

## 2025-05-21 ENCOUNTER — HOSPITAL ENCOUNTER (OUTPATIENT)
Facility: HOSPITAL | Age: 20
Setting detail: HOSPITAL OUTPATIENT SURGERY
Discharge: HOME | End: 2025-05-21
Attending: STUDENT IN AN ORGANIZED HEALTH CARE EDUCATION/TRAINING PROGRAM | Admitting: STUDENT IN AN ORGANIZED HEALTH CARE EDUCATION/TRAINING PROGRAM
Payer: COMMERCIAL

## 2025-05-21 ENCOUNTER — ANESTHESIA EVENT (OUTPATIENT)
Dept: ENDOSCOPY | Facility: HOSPITAL | Age: 20
End: 2025-05-21
Payer: COMMERCIAL

## 2025-05-21 ENCOUNTER — ANESTHESIA (OUTPATIENT)
Dept: ENDOSCOPY | Facility: HOSPITAL | Age: 20
End: 2025-05-21
Payer: COMMERCIAL

## 2025-05-21 VITALS
HEIGHT: 67 IN | SYSTOLIC BLOOD PRESSURE: 112 MMHG | BODY MASS INDEX: 43.16 KG/M2 | HEART RATE: 83 BPM | WEIGHT: 275 LBS | OXYGEN SATURATION: 96 % | RESPIRATION RATE: 19 BRPM | DIASTOLIC BLOOD PRESSURE: 84 MMHG

## 2025-05-21 DIAGNOSIS — R10.9 ABDOMINAL PAIN, UNSPECIFIED ABDOMINAL LOCATION: ICD-10-CM

## 2025-05-21 LAB — B-HCG UR QL: NEGATIVE

## 2025-05-21 PROCEDURE — 43239 EGD BIOPSY SINGLE/MULTIPLE: CPT | Performed by: STUDENT IN AN ORGANIZED HEALTH CARE EDUCATION/TRAINING PROGRAM

## 2025-05-21 RX ORDER — SODIUM CHLORIDE, SODIUM LACTATE, POTASSIUM CHLORIDE, CALCIUM CHLORIDE 600; 310; 30; 20 MG/100ML; MG/100ML; MG/100ML; MG/100ML
INJECTION, SOLUTION INTRAVENOUS CONTINUOUS
Status: DISCONTINUED | OUTPATIENT
Start: 2025-05-21 | End: 2025-05-21

## 2025-05-21 RX ORDER — LIDOCAINE HYDROCHLORIDE 10 MG/ML
INJECTION, SOLUTION EPIDURAL; INFILTRATION; INTRACAUDAL; PERINEURAL AS NEEDED
Status: DISCONTINUED | OUTPATIENT
Start: 2025-05-21 | End: 2025-05-21 | Stop reason: SURG

## 2025-05-21 RX ADMIN — LIDOCAINE HYDROCHLORIDE 50 MG: 10 INJECTION, SOLUTION EPIDURAL; INFILTRATION; INTRACAUDAL; PERINEURAL at 13:17:00

## 2025-05-21 NOTE — DISCHARGE INSTRUCTIONS
Home Care Instructions for Gastroscopy with Sedation    Diet:  - Resume your regular diet as tolerated unless otherwise instructed.  - Start with light meals to minimize bloating.  - Do not drink alcohol today.    Medication:  - If you have questions about resuming your normal medications, please contact your Primary Care Physician.    Activities:  - Take it easy today. Do not return to work today.  - Do not drive today.  - Do not operate any machinery today (including kitchen equipment).    Gastroscopy:  - You may have a sore throat for 2-3 days following the exam. This is normal. Gargling with warm salt water (1/2 tsp salt to 1 glass warm water) or using throat lozenges will help.  - If you experience any sharp pain in your neck, abdomen or chest, vomiting of blood, oral temperature over 100 degrees Fahrenheit, light-headedness or dizziness, or any other problems, contact your doctor.    **If unable to reach your doctor, please go to the [facility] Emergency Room**    - Your referring physician will receive a full report of your examination.  - If you do not hear from your doctor's office within two weeks of your biopsy, please call them for your results.      You may be able to see your laboratory results in [employer] between 4 and 7 business days.  In some cases, your physician may not have viewed the results before they are released to [employer].  If you have questions regarding your results contact the physician who ordered the test/exam by phone or via [employer] by choosing \"Ask a Medical Question.\"

## 2025-05-21 NOTE — ANESTHESIA POSTPROCEDURE EVALUATION
Patient: Ashley Callahan [place]    Procedure Summary       Date: 05/21/25 Room / Location: [facility] ENDOSCOPY 03 / [facility] ENDOSCOPY    Anesthesia Start: 1314 Anesthesia Stop: 1341    Procedure: ESOPHAGOGASTRODUODENOSCOPY Diagnosis:       Abdominal pain, unspecified abdominal location      (hiatal hernia 4 cm,esophagitis,)    Surgeons: Hal Horvath MD Anesthesiologist: Kristy Winters CRNA    Anesthesia Type: MAC ASA Status: 2            Anesthesia Type: No value filed.    Vitals Value Taken Time   BP 90/59 05/21/25 13:55   Temp 97 05/21/25 13:57   Pulse 85 05/21/25 13:57   Resp 25 05/21/25 13:57   SpO2 97 % 05/21/25 13:57   Vitals shown include unfiled device data.    [facility] AN Post Evaluation:   Patient Evaluated in PACU  Patient Participation: complete - patient participated  Level of Consciousness: awake  Pain Score: 0  Pain Management: adequate  Airway Patency:patent  Yes    Nausea/Vomiting: none  Cardiovascular Status: acceptable  Respiratory Status: acceptable  Postoperative Hydration acceptable      Kristy Winters CRNA  5/21/2025 1:57 PM

## 2025-05-21 NOTE — H&P
History & Physical Examination    Patient Name: Ashley Ramirez  MRN: [id number]  CSN: [id number]  YOB: 2005    Diagnosis: abdominal pain, nausea, vomiting    Prescriptions Prior to Admission[1]  Current Hospital Medications[2]    Allergies: Allergies[3]    Past Medical History[4]  Past Surgical History[5]  Family History[6]  Social History     Tobacco Use    Smoking status: Never    Smokeless tobacco: Never   Substance Use Topics    Alcohol use: No       SYSTEM Check if Review is Normal Check if Physical Exam is Normal If not normal, please explain:   HEENT [X ] [ X]    NECK  [X ] [ X]    HEART [X ] [ X]    LUNGS [X ] [ X]    ABDOMEN [X ] [ X]    EXTREMITIES [X ] [ X]    OTHER        I have discussed the risks and benefits and alternatives of the procedure with the patient/family.  They understand and agree to proceed with plan of care.   I have reviewed the History and Physical done within the last 30 days.  Any changes noted above.    Hal Horvath MD  [facility]                   [1]   Medications Prior to Admission   Medication Sig Dispense Refill Last Dose/Taking    Omeprazole 40 MG Oral Capsule Delayed Release Take 1 capsule (40 mg total) by mouth daily. Take 1 capsule by mouth daily before breakfast. 30 capsule 3 5/17/2025    Phentermine HCl 37.5 MG Oral Cap Take 1 capsule (37.5 mg total) by mouth every morning. 30 capsule 3 5/5/2025    mometasone furoate 50 MCG/ACT Nasal Suspension 2 sprays by Nasal route in the morning.       Etonogestrel (NEXPLANON) 68 MG Subcutaneous Implant Inject into the skin.   5/21/2025   [2]   Current Facility-Administered Medications   Medication Dose Route Frequency    lactated ringers infusion   Intravenous Continuous   [3]   Allergies  Allergen Reactions    Coconut Fatty Acids FACE FLUSHING   [4]   Past Medical History:   Anxiety state    Asthma (HCC)    Attention deficit hyperactivity disorder (ADHD)    Esophageal reflux     Extrinsic asthma, unspecified    Multiple allergies    Prediabetes    Visual impairment    glasses   [5] History reviewed. No pertinent surgical history.  [6]   Family History  Problem Relation Age of Onset    Asthma Father     Hypertension Father     Lipids Mother

## 2025-05-21 NOTE — ANESTHESIA PREPROCEDURE EVALUATION
Anesthesia PreOp Note    HPI:     Ashley Ramirez is a 19 year old female who presents for preoperative consultation requested by: Hal Horvath MD    Date of Surgery: 5/21/2025    Procedure(s):  ESOPHAGOGASTRODUODENOSCOPY  Indication: Abdominal pain, unspecified abdominal location    Relevant Problems   No relevant active problems       NPO:  Last Liquid Consumption Date: 05/20/25  Last Liquid Consumption Time: 2000  Last Solid Consumption Date: 05/20/25  Last Solid Consumption Time: 2000  Last Liquid Consumption Date: 05/20/25          History Review:  Patient Active Problem List    Diagnosis Date Noted    Abnormal CT scan 02/18/2025    Nexplanon in place 10/30/2024    Right ovarian cyst 10/30/2024    Encounter for therapeutic drug monitoring 06/11/2024    Class 3 severe obesity with serious comorbidity and body mass index (BMI) of 45.0 to 49.9 in adult 06/11/2024    Prediabetes 06/11/2024    Nexplanon insertion 08/18/2023    Contraceptive management 07/21/2023    Behavior problem in pediatric patient 04/19/2021    Other allergy, other than to medicinal agents 08/18/2012    Attention deficit disorder with hyperactivity 07/09/2012       Past Medical History[1]    Past Surgical History[2]    Prescriptions Prior to Admission[3]  Current Medications and Prescriptions Ordered in Epic[4]    Allergies[5]    Family History[6]  Social Hx on file[7]    Available pre-op labs reviewed.             Vital Signs:  Body mass index is 43.07 kg/m².   height is 1.702 m (5' 7\") and weight is 124.7 kg (275 lb). Her blood pressure is 124/76 and her pulse is 84. Her respiration is 19 and oxygen saturation is 98%.   Vitals:    05/15/25 1320 05/21/25 1236   BP:  124/76   Pulse:  84   Resp:  19   SpO2:  98%   Weight: 124.7 kg (275 lb)    Height: 1.702 m (5' 7\")         Anesthesia Evaluation      Airway   Mallampati: II  TM distance: >3 FB  Neck ROM: full  Dental - Dentition appears grossly intact     Pulmonary - normal  exam    breath sounds clear to auscultation  (+) asthma  Cardiovascular - normal exam    Rhythm: regular  Rate: normal    Neuro/Psych      GI/Hepatic/Renal    (+) GERD    Endo/Other    Abdominal  - normal exam  (+) obese                 Anesthesia Plan:   ASA:  2  Plan:   MAC  Informed Consent Plan and Risks Discussed With:  Patient  Use of Blood Products Discussed With:  Patient  Discussed plan with:  Attending      I have informed Ashley Ramirez and/or legal guardian or family member of the nature of the anesthetic plan, benefits, risks including possible dental damage if relevant, major complications, and any alternative forms of anesthetic management.   All of the patient's questions were answered to the best of my ability. The patient desires the anesthetic management as planned.  Kristy Winters CRNA  5/21/2025 12:41 PM  Present on Admission:  **None**           [1]   Past Medical History:   Anxiety state    Asthma (HCC)    Attention deficit hyperactivity disorder (ADHD)    Esophageal reflux    Extrinsic asthma, unspecified    Multiple allergies    Prediabetes    Visual impairment    glasses   [2] History reviewed. No pertinent surgical history.  [3]   Medications Prior to Admission   Medication Sig Dispense Refill Last Dose/Taking    Omeprazole 40 MG Oral Capsule Delayed Release Take 1 capsule (40 mg total) by mouth daily. Take 1 capsule by mouth daily before breakfast. 30 capsule 3 5/17/2025    Phentermine HCl 37.5 MG Oral Cap Take 1 capsule (37.5 mg total) by mouth every morning. 30 capsule 3 5/5/2025    mometasone furoate 50 MCG/ACT Nasal Suspension 2 sprays by Nasal route in the morning.       Etonogestrel (NEXPLANON) 68 MG Subcutaneous Implant Inject into the skin.   5/21/2025   [4]   Current Facility-Administered Medications Ordered in Epic   Medication Dose Route Frequency Provider Last Rate Last Admin    lactated ringers infusion   Intravenous Continuous Hal Horvath MD         No  current Epic-ordered outpatient medications on file.   [5]   Allergies  Allergen Reactions    Coconut Fatty Acids FACE FLUSHING   [6]   Family History  Problem Relation Age of Onset    Asthma Father     Hypertension Father     Lipids Mother    [7]   Social History  Socioeconomic History    Marital status: Single   Tobacco Use    Smoking status: Never    Smokeless tobacco: Never   Vaping Use    Vaping status: Never Used   Substance and Sexual Activity    Alcohol use: No    Drug use: Never   Other Topics Concern    Caffeine Concern No

## 2025-05-21 NOTE — OPERATIVE REPORT
ESOPHAGOGASTRODUODENOSCOPY (EGD) REPORT    Ashley Ramirez     2005 Age 19 year old   PCP Marci Smith MD Endoscopist Hal Horvath MD       Date of procedure: 25    Procedure: EGD w/ biopsies    Pre-operative diagnosis: nausea, vomiting, abdominal pain    Post-operative diagnosis: see impression    Medications: MAC    Complications: none    Procedure:  Informed consent was obtained from the patient after the risks of the procedure were discussed, including but not limited to bleeding, perforation, aspiration, infection, or possibility of a missed lesion. After discussions of the risks/benefits and alternatives to this procedure, as well as the planned sedation, the patient was placed in the left lateral decubitus position and begun on continuous blood pressure pulse oximetry and EKG monitoring and this was maintained throughout the procedure. Once an adequate level of sedation was obtained a bite block was placed. Then the lubricated tip of the [id number] diagnostic video upper endoscope was inserted and advanced using direct visualization into the posterior pharynx and ultimately into the esophagus. The scope was then advanced through the stomach and to the second portion of the duodenum.    Complications: None    Findings:      1. Esophagus: The squamocolumnar junction was noted at 34 cm and appeared irregular. The diaphragmatic pinch was noted noted at 38 cm from the incisors. 4 cm hiatal hernia. The esophageal mucosa appeared erythematous in the distal portion just proximal to the hiatal hernia. Biopsies were taken of the distal esophagus (esophagitis) and proximal esophagus with cold forceps for histology.    2. Stomach: The stomach distended normally. Normal rugal folds were seen. The pylorus was patent. Retroflexion revealed a normal fundus. The gastric mucosa appeared healthy and normal. Biopsies were taken with a cold forceps from the antrum, incisura and body for  histology.     3. Duodenum: The duodenal mucosa appeared normal in the bulb and 2nd portion of the duodenum. Biopsied.    Impression:  -Esophagus: 4 cm hiatal hernia, esophagitis (likely reflux induced in setting of hiatal hernia), LA grade A. Biopsied.  -Stomach: Unremarkable, biopsied.  -Duodenum: Unremarkable examined portion, biopsied.  -Suspect symptoms are largely related to reflux disease.    Recommend:  1. Await pathology results.  2. Will discuss acid suppression regimen once pathology returns.    >>>If tissue was sampled/removed and you have not received your pathology results either by phone or letter within 2 weeks, please call our office at [phone].    Specimens:  Duodenum, gastric, distal esophagus, proximal esophagus.    Blood loss: <1 ml

## 2025-05-22 NOTE — ADDENDUM NOTE
Addendum  created 05/22/25 1100 by Kristy Winters, CRNA    Review and Sign - Ready for Procedure

## 2025-05-29 ENCOUNTER — TELEPHONE (OUTPATIENT)
Facility: CLINIC | Age: 20
End: 2025-05-29

## 2025-05-29 NOTE — TELEPHONE ENCOUNTER
I spoke to the patient, has increased eosinophils in the distal esophagus, normal proximal esophageal biopsies -- has large hiatal hernia.    Likely symptomatic from GERD in setting of hiatal hernia.    Needs to take Omeprazole 40mg BID and ensure 30 to 60 minutes BEFORE meals.    Should also supplement with over the counter liquid gaviscon AFTER meals.    Try this for 3 months - follow up with Edna Lundy to assess response.

## 2025-07-07 DIAGNOSIS — R73.03 PREDIABETES: ICD-10-CM

## 2025-07-07 DIAGNOSIS — E66.813 CLASS 3 SEVERE OBESITY WITH SERIOUS COMORBIDITY AND BODY MASS INDEX (BMI) OF 45.0 TO 49.9 IN ADULT, UNSPECIFIED OBESITY TYPE: ICD-10-CM

## 2025-07-07 DIAGNOSIS — Z51.81 ENCOUNTER FOR THERAPEUTIC DRUG MONITORING: ICD-10-CM

## 2025-07-07 RX ORDER — PHENTERMINE HYDROCHLORIDE 37.5 MG/1
37.5 CAPSULE ORAL EVERY MORNING
Qty: 30 CAPSULE | Refills: 0 | OUTPATIENT
Start: 2025-07-07

## 2025-07-07 RX ORDER — OMEPRAZOLE 40 MG/1
40 CAPSULE, DELAYED RELEASE ORAL
Qty: 90 CAPSULE | Refills: 1 | Status: SHIPPED | OUTPATIENT
Start: 2025-07-07

## 2025-07-07 RX ORDER — PHENTERMINE HYDROCHLORIDE 37.5 MG/1
37.5 CAPSULE ORAL EVERY MORNING
Qty: 30 CAPSULE | Refills: 3 | OUTPATIENT
Start: 2025-07-07

## 2025-07-07 NOTE — TELEPHONE ENCOUNTER
Requesting phentermine  LOV: 1/6/25  RTC: 3 months  Filled: 1/6/25 #30 with 3 refills    Future Appointments   Date Time Provider Department Center   1/15/2026  2:20 PM Leanna Mays APRN [facility]     Denied. Hasn't been seen in greater than 6 months and future appointment is too far away. Patient informed

## 2025-07-07 NOTE — TELEPHONE ENCOUNTER
Requested Prescriptions     Pending Prescriptions Disp Refills    OMEPRAZOLE 40 MG Oral Capsule Delayed Release [Pharmacy Med Name: OMEPRAZOLE DR 40 MG CAPSULE] 90 capsule 1     Sig: TAKE 1 CAPSULE BY MOUTH DAILY BEFORE BREAKFAST.         LOV  1/29/2025      LR    4/10/2025      OK

## 2025-07-22 ENCOUNTER — LAB ENCOUNTER (OUTPATIENT)
Dept: LAB | Facility: HOSPITAL | Age: 20
End: 2025-07-22
Attending: NURSE PRACTITIONER
Payer: COMMERCIAL

## 2025-07-22 ENCOUNTER — OFFICE VISIT (OUTPATIENT)
Dept: OBGYN CLINIC | Facility: CLINIC | Age: 20
End: 2025-07-22
Payer: COMMERCIAL

## 2025-07-22 VITALS
DIASTOLIC BLOOD PRESSURE: 77 MMHG | BODY MASS INDEX: 46 KG/M2 | SYSTOLIC BLOOD PRESSURE: 116 MMHG | HEART RATE: 76 BPM | WEIGHT: 292 LBS

## 2025-07-22 DIAGNOSIS — R39.9 URINARY TRACT INFECTION SYMPTOMS: ICD-10-CM

## 2025-07-22 DIAGNOSIS — N89.8 VAGINAL IRRITATION: ICD-10-CM

## 2025-07-22 DIAGNOSIS — R39.9 URINARY TRACT INFECTION SYMPTOMS: Primary | ICD-10-CM

## 2025-07-22 LAB
BILIRUB UR QL: NEGATIVE
CLARITY UR: CLEAR
GLUCOSE UR-MCNC: NORMAL MG/DL
HGB UR QL STRIP.AUTO: NEGATIVE
KETONES UR-MCNC: NEGATIVE MG/DL
LEUKOCYTE ESTERASE UR QL STRIP.AUTO: NEGATIVE
NITRITE UR QL STRIP.AUTO: NEGATIVE
PH UR: 5.5 [PH] (ref 5–8)
PROT UR-MCNC: NEGATIVE MG/DL
SP GR UR STRIP: 1.02 (ref 1–1.03)
UROBILINOGEN UR STRIP-ACNC: NORMAL

## 2025-07-22 PROCEDURE — 99213 OFFICE O/P EST LOW 20 MIN: CPT | Performed by: NURSE PRACTITIONER

## 2025-07-22 PROCEDURE — 3078F DIAST BP <80 MM HG: CPT | Performed by: NURSE PRACTITIONER

## 2025-07-22 PROCEDURE — 3074F SYST BP LT 130 MM HG: CPT | Performed by: NURSE PRACTITIONER

## 2025-07-22 PROCEDURE — 81003 URINALYSIS AUTO W/O SCOPE: CPT

## 2025-07-22 RX ORDER — NITROFURANTOIN 25; 75 MG/1; MG/1
100 CAPSULE ORAL 2 TIMES DAILY
Qty: 14 CAPSULE | Refills: 0 | Status: SHIPPED | OUTPATIENT
Start: 2025-07-22 | End: 2025-07-29

## 2025-07-22 NOTE — PROGRESS NOTES
The following individual(s) verbally consented to be recorded using ambient AI listening technology and understand that they can each withdraw their consent to this listening technology at any point by asking the clinician to turn off or pause the recording:    Patient name: Ashley Kamphill  Additional names: Ludmila - sister

## 2025-07-22 NOTE — PROGRESS NOTES
[facility]   Obstetrics and Gynecology    Ashley Ramirez is a 19 year old female  Patient's last menstrual period was 2025.   Chief Complaint   Patient presents with    Gyn Problem     Vaginal itching // Burning during urination and frequent urge to urinate per pt   .   History of Present Illness  Ashley Ramirez is a 19 year old female who presents with urinary symptoms including dysuria and vaginal odor and irritation. Her sister is here with her today    Lower urinary tract symptoms  - Dysuria with burning sensation  - Constant urge to urinate, even after voiding  - Severe pruritus  - Intermittent lower pelvic pain  - Symptoms have intensified recently  - No fever, nausea, or vomiting    Vaginal symptoms  - Fishy odor after showering  - No abnormal vaginal discharge  - No visible bumps or redness in the vaginal area    Sexual and menstrual history  - Sexually active with the same partner  - No pain or bleeding during intercourse  - Uses Nexplanon for contraception  - Regular menstrual periods approximately every two months    Relevant exposures and risk factors  - No recent changes in soaps, detergents, or antibiotic use  - No excessive caffeine intake  - Increased physical activity recently    Metabolic and medication history  - Manages prediabetes with phentermine        Pap:never  Contraception: nexplanon    OBSTETRICS HISTORY:  OB History    Para Term  AB Living   0 0 0 0 0 0   SAB IAB Ectopic Multiple Live Births   0 0 0 0 0       GYNE HISTORY:  Menarche: 8 (2025  9:25 AM)  Period Cycle (Days): IRREGUALR WITH NEXPLANON (2025  9:25 AM)  Period Duration (Days): IRREGULAR (2025  3:03 PM)  Use of Birth Control (if yes, specify type): Nexplanon (2025  9:25 AM)  Pap Result Notes: No pap under 21 (2025  9:25 AM)      History   Sexual Activity    Sexual activity: Not on file       MEDICAL HISTORY:  Past Medical History:    Anxiety state     Asthma (HCC)    Attention deficit hyperactivity disorder (ADHD)    Esophageal reflux    Extrinsic asthma, unspecified    Multiple allergies    Prediabetes    Visual impairment    glasses       SOCIAL HISTORY:  Social History     Socioeconomic History    Marital status: Single     Spouse name: Not on file    Number of children: Not on file    Years of education: Not on file    Highest education level: Not on file   Occupational History    Not on file   Tobacco Use    Smoking status: Never    Smokeless tobacco: Never   Vaping Use    Vaping status: Never Used   Substance and Sexual Activity    Alcohol use: No    Drug use: Never    Sexual activity: Not on file   Other Topics Concern     Service Not Asked    Blood Transfusions Not Asked    Caffeine Concern No    Occupational Exposure Not Asked    Hobby Hazards Not Asked    Sleep Concern Not Asked    Stress Concern Not Asked    Weight Concern Not Asked    Special Diet Not Asked    Back Care Not Asked    Exercise Not Asked    Bike Helmet Not Asked    Seat Belt Not Asked    Self-Exams Not Asked   Social History Narrative    Not on file     Social Drivers of Health     Food Insecurity: Not on file   Transportation Needs: Not on file   Stress: Not on file   Housing Stability: Not on file       MEDICATIONS:    Current Outpatient Medications:     nitrofurantoin monohydrate macro (MACROBID) 100 MG Oral Cap, Take 1 capsule (100 mg total) by mouth 2 (two) times daily for 7 days., Disp: 14 capsule, Rfl: 0    OMEPRAZOLE 40 MG Oral Capsule Delayed Release, TAKE 1 CAPSULE BY MOUTH DAILY BEFORE BREAKFAST., Disp: 90 capsule, Rfl: 1    mometasone furoate 50 MCG/ACT Nasal Suspension, 2 sprays by Nasal route in the morning., Disp: , Rfl:     Etonogestrel (NEXPLANON) 68 MG Subcutaneous Implant, Inject into the skin., Disp: , Rfl:     Phentermine HCl 37.5 MG Oral Cap, Take 1 capsule (37.5 mg total) by mouth every morning., Disp: 30 capsule, Rfl: 3    ALLERGIES:    Allergies    Allergen Reactions    Coconut Fatty Acids FACE FLUSHING         Review of Systems:  Constitutional:  Denies fatigue, night sweats, hot flashes  Cardiovascular:  denies chest pain or palpitations  Respiratory:  denies shortness of breath  Gastrointestinal:  denies heartburn, abdominal pain, diarrhea or constipation  Genitourinary:  see HPI  Musculoskeletal:  denies back pain.  Skin/Breast:  Denies any breast pain, lumps, or discharge.   Neurological:  denies headaches, extremity weakness or numbness.  Psychiatric: denies depression or anxiety.  Endocrine:   denies excessive thirst or urination.  Heme/Lymph:  denies history of anemia, easy bruising or bleeding.      PHYSICAL EXAM:     Vitals:    07/22/25 0926   BP: 116/77   Pulse: 76   Weight: 292 lb (132.5 kg)     Body mass index is 45.73 kg/m².     Patient offered chaperone, patient declined    Constitutional: well developed, well nourished    Psychiatric:  Oriented to time, place, person and situation. Appropriate mood and affect    Pelvic Exam:  External Genitalia: normal appearance, hair distribution, and no lesions  Urethral Meatus:  normal in size, location, without lesions and prolapse  Bladder:  No fullness, masses or tenderness  Vagina:  Normal appearance without lesions, no abnormal discharge  Cervix:  Normal without tenderness on motion  Uterus: normal in size, contour, position, mobility, without tenderness  Adnexa: normal without masses or tenderness  Perineum: normal  Anus: no hemorroids   Lymph node: no inguinal lymph nodes      Assessment & Plan:    ICD-10-CM    1. Urinary tract infection symptoms  R39.9 Urinalysis, Routine     Urine Culture, Routine     nitrofurantoin monohydrate macro (MACROBID) 100 MG Oral Cap      2. Vaginal irritation  N89.8 Chlamydia/Gc Amplification     Vaginitis Vaginosis PCR Panel              Assessment & Plan  Urinary Tract Infection (UTI)  Primary suspicion of UTI with symptoms of dysuria, frequent urination, and pelvic  pain. Differential includes possible vaginal infection.  -  cultures for yeast and bacterial infections.  - Order urine test to confirm UTI.  - Prescribe nitrofurantoin (Macrobid) twice daily for seven days.  - Advise increased water intake and abstinence from sexual intercourse for 7-10 days.  - Recommend over-the-counter Azo for symptom relief, warn of potential urine discoloration.          ODIN Seymour        This note was prepared using Dragon Medical voice recognition dictation software. As a result errors may occur. When identified these errors have been corrected. While every attempt is made to correct errors during dictation discrepancies may still exist.

## 2025-07-23 LAB
BV BACTERIA DNA VAG QL NAA+PROBE: NEGATIVE
C GLABRATA DNA VAG QL NAA+PROBE: NEGATIVE
C KRUSEI DNA VAG QL NAA+PROBE: NEGATIVE
C TRACH DNA SPEC QL NAA+PROBE: NEGATIVE
CANDIDA DNA VAG QL NAA+PROBE: NEGATIVE
N GONORRHOEA DNA SPEC QL NAA+PROBE: NEGATIVE
T VAGINALIS DNA VAG QL NAA+PROBE: NEGATIVE

## 2025-08-08 ENCOUNTER — OFFICE VISIT (OUTPATIENT)
Dept: INTERNAL MEDICINE CLINIC | Facility: CLINIC | Age: 20
End: 2025-08-08

## 2025-08-08 VITALS
HEIGHT: 67 IN | OXYGEN SATURATION: 96 % | HEART RATE: 79 BPM | BODY MASS INDEX: 45.52 KG/M2 | SYSTOLIC BLOOD PRESSURE: 110 MMHG | WEIGHT: 290 LBS | DIASTOLIC BLOOD PRESSURE: 76 MMHG | RESPIRATION RATE: 18 BRPM

## 2025-08-08 DIAGNOSIS — E66.813 CLASS 3 SEVERE OBESITY WITH SERIOUS COMORBIDITY AND BODY MASS INDEX (BMI) OF 45.0 TO 49.9 IN ADULT, UNSPECIFIED OBESITY TYPE: ICD-10-CM

## 2025-08-08 DIAGNOSIS — R73.03 PREDIABETES: Primary | ICD-10-CM

## 2025-08-08 DIAGNOSIS — Z51.81 ENCOUNTER FOR THERAPEUTIC DRUG MONITORING: ICD-10-CM

## 2025-08-08 PROBLEM — E66.9 OBESITY, UNSPECIFIED: Status: ACTIVE | Noted: 2025-08-08

## 2025-08-08 PROCEDURE — 99214 OFFICE O/P EST MOD 30 MIN: CPT | Performed by: NURSE PRACTITIONER

## 2025-08-08 PROCEDURE — 3078F DIAST BP <80 MM HG: CPT | Performed by: NURSE PRACTITIONER

## 2025-08-08 PROCEDURE — 3074F SYST BP LT 130 MM HG: CPT | Performed by: NURSE PRACTITIONER

## 2025-08-08 PROCEDURE — 3008F BODY MASS INDEX DOCD: CPT | Performed by: NURSE PRACTITIONER

## 2025-08-08 RX ORDER — PHENTERMINE HYDROCHLORIDE 37.5 MG/1
37.5 CAPSULE ORAL EVERY MORNING
Qty: 30 CAPSULE | Refills: 3 | Status: SHIPPED | OUTPATIENT
Start: 2025-08-08

## 2025-08-08 RX ORDER — LOPERAMIDE HYDROCHLORIDE 2 MG/1
CAPSULE ORAL
COMMUNITY
Start: 2025-08-06 | End: 2025-08-16

## 2025-08-08 RX ORDER — METFORMIN HYDROCHLORIDE 500 MG/1
500 TABLET, EXTENDED RELEASE ORAL DAILY
Qty: 90 TABLET | Refills: 0 | Status: SHIPPED | OUTPATIENT
Start: 2025-08-08

## 2025-08-08 RX ORDER — ONDANSETRON 4 MG/1
8 TABLET, FILM COATED ORAL
COMMUNITY
Start: 2025-08-06

## 2025-08-11 ENCOUNTER — OFFICE VISIT (OUTPATIENT)
Dept: INTERNAL MEDICINE CLINIC | Facility: CLINIC | Age: 20
End: 2025-08-11

## 2025-08-11 DIAGNOSIS — E66.813 CLASS 3 SEVERE OBESITY WITH SERIOUS COMORBIDITY AND BODY MASS INDEX (BMI) OF 45.0 TO 49.9 IN ADULT, UNSPECIFIED OBESITY TYPE: Primary | ICD-10-CM

## (undated) DEVICE — KIT ENDO ORCAPOD 160/180/190

## (undated) DEVICE — YANKAUER,BULB TIP,W/O VENT,RIGID,STERILE: Brand: MEDLINE

## (undated) DEVICE — Device

## (undated) DEVICE — GIJAW SINGLE-USE BIOPSY FORCEPS WITH NEEDLE: Brand: GIJAW

## (undated) DEVICE — 60 ML SYRINGE REGULAR TIP: Brand: MONOJECT

## (undated) DEVICE — MEDI-VAC NON-CONDUCTIVE SUCTION TUBING: Brand: CARDINAL HEALTH

## (undated) DEVICE — V2 SPECIMEN COLLECTION MANIFOLD KIT: Brand: NEPTUNE

## (undated) DEVICE — CONMED SCOPE SAVER BITE BLOCK, 20X27 MM: Brand: SCOPE SAVER

## (undated) DEVICE — KIT CLEAN ENDOKIT 1.1OZ GOWNX2

## (undated) DEVICE — MEDI-VAC NON-CONDUCTIVE SUCTION TUBING 6MM X 1.8M (6FT.) L: Brand: CARDINAL HEALTH

## (undated) NOTE — LETTER
[facility]  [address], [place], [place]    Authorization for Surgical Operation and Procedure                               I hereby authorize Hal Horvath MD, my physician and his/her assistants (if applicable), which may include medical students, residents, and/or fellows, to perform the following surgical operation/ procedure and administer such anesthesia as may be determined necessary by my physician: Operation/Procedure name (s) ESOPHAGOGASTRODUODENOSCOPY on [facility]   2.   I recognize that during the surgical operation/procedure, unforeseen conditions may necessitate additional or different procedures than those listed above.  I, therefore, further authorize and request that the above-named surgeon, assistants, or designees perform such procedures as are, in their judgment, necessary and desirable.    3.   My surgeon/physician has discussed prior to my surgery the potential benefits, risks and side effects of this procedure; the likelihood of achieving goals; and potential problems that might occur during recuperation.  They also discussed reasonable alternatives to the procedure, including risks, benefits, and side effects related to the alternatives and risks related to not receiving this procedure.  I have had all my questions answered and I acknowledge that no guarantee has been made as to the result that may be obtained.    4.   Should the need arise during my operation/procedure, which includes change of level of care prior to discharge, I also consent to the administration of blood and/or blood products.  Further, I understand that despite careful testing and screening of blood or blood products by collecting agencies, I may still be subject to ill effects as a result of receiving a blood transfusion and/or blood products.  The following are some, but not all, of the potential risks that can occur: fever and allergic reactions, hemolytic reactions,  transmission of diseases such as Hepatitis, AIDS and Cytomegalovirus (CMV) and fluid overload.  In the event that I wish to have an autologous transfusion of my own blood, or a directed donor transfusion, I will discuss this with my physician.  Check only if Refusing Blood or Blood Products  I understand refusal of blood or blood products as deemed necessary by my physician may have serious consequences to my condition to include possible death. I hereby assume responsibility for my refusal and release the hospital, its personnel, and my physicians from any responsibility for the consequences of my refusal.    o  Refuse   5.   I authorize the use of any specimen, organs, tissues, body parts or foreign objects that may be removed from my body during the operation/procedure for diagnosis, research or teaching purposes and their subsequent disposal by hospital authorities.  I also authorize the release of specimen test results and/or written reports to my treating physician on the hospital medical staff or other referring or consulting physicians involved in my care, at the discretion of the Pathologist or my treating physician.    6.   I consent to the photographing or videotaping of the operations or procedures to be performed, including appropriate portions of my body for medical, scientific, or educational purposes, provided my identity is not revealed by the pictures or by descriptive texts accompanying them.  If the procedure has been photographed/videotaped, the surgeon will obtain the original picture, image, videotape or CD.  The hospital will not be responsible for storage, release or maintenance of the picture, image, tape or CD.    7.   I consent to the presence of a  or observers in the operating room as deemed necessary by my physician or their designees.    8.   I recognize that in the event my procedure results in extended X-Ray/fluoroscopy time, I may develop a skin reaction.    9. If  I have a Do Not Attempt Resuscitation (DNAR) order in place, that status will be suspended while in the operating room, procedural suite, and during the recovery period unless otherwise explicitly stated by me (or a person authorized to consent on my behalf). The surgeon or my attending physician will determine when the applicable recovery period ends for purposes of reinstating the DNAR order.  10. Patients having a sterilization procedure: I understand that if the procedure is successful the results will be permanent and it will therefore be impossible for me to inseminate, conceive, or bear children.  I also understand that the procedure is intended to result in sterility, although the result has not been guaranteed.   11. I acknowledge that my physician has explained sedation/analgesia administration to me including the risk and benefits I consent to the administration of sedation/analgesia as may be necessary or desirable in the judgment of my physician.    I CERTIFY THAT I HAVE READ AND FULLY UNDERSTAND THE ABOVE CONSENT TO OPERATION and/or OTHER PROCEDURE.     ____________________________________  _________________________________        ______________________________  Signature of Patient    Signature of Responsible Person                Printed Name of Responsible Person                                      ____________________________________  _____________________________                ________________________________  Signature of Witness        Date  Time         Relationship to Patient    STATEMENT OF PHYSICIAN My signature below affirms that prior to the time of the procedure; I have explained to the patient and/or his/her legal representative, the risks and benefits involved in the proposed treatment and any reasonable alternative to the proposed treatment. I have also explained the risks and benefits involved in refusal of the proposed treatment and alternatives to the proposed treatment and have  answered the patient's questions. If I have a significant financial interest in a co-management agreement or a significant financial interest in any product or implant, or other significant relationship used in this procedure/surgery, I have disclosed this and had a discussion with my patient.     _____________________________________________________              _____________________________  (Signature of Physician)                                                                                         (Date)                                   (Time)  Patient Name: Ashley Kamphill      : 2005      Printed: 2025     Medical Record #: [id number]                                      Page 1 of 1

## (undated) NOTE — LETTER
[address]  [address] [facility]  [place] [address]  [phone]          04/21/22    Rashida Toussaint  7/26/2005        This document is to verify Rashida Toussaint was seen in the DEPARTMENT OF [facility] [place] for evaluation and treatment of a medical ailment.        Thank you,        Jose Alberto Gorman PA-C

## (undated) NOTE — ED AVS SNAPSHOT
Parent/Legal Guardian Access to the Online [employer] Record of a Patient 15to 16Years Old  Return completed form by Secure email to [facility] HIM/Medical Records Department: [email or web address]     Requirements and Procedures   Under [facility] MyChart ID and password with another person, that person may be able to view my or my child’s health information, and health information about someone who has authorized me as a MyChart proxy.    ·  I agree that it is my responsibility to select a confident Sign-Up Form and I agree to its terms.        Authorization Form     Please enter Patient’s information below:   Name (last, first, middle initial) __________________________________________   Gender  Male  Female    Last 4 Digits of Social Security Number Parent/Legal Guardian Signature                                  For Patient (1517 years of age)  I agree to allow my parent/legal guardian, named above, online access to my medical information currently available and that may become available as a result

## (undated) NOTE — LETTER
AUTHORIZATION FOR SURGICAL OPERATION OR OTHER PROCEDURE    1. I hereby authorize Dr. Jyoti Yates, and [place] [employer] [facility] staff assigned to my case to perform the following operation and/or procedure at the [facility]:    ______________________________Nexplanon insertion  _________________________________________________________________      _______________________________________________________________________________________________    2. My physician has explained the nature and purpose of the operation or other procedure, possible alternative methods of treatment, the risks involved, and the possibility of complication to me. I acknowledge that no guarantee has been made as to the result that may be obtained. 3.  I recognize that, during the course of this operation, or other procedure, unforseen conditions may necessitate additional or different procedure than those listed above. I, therefore, further authorize and request that the above named physician, his/her physician assistants or designees perform such procedures as are, in his/her professional opinion, necessary and desirable. 4.  Any tissue or organs removed in the operation or other procedure may be disposed of by and at the discretion of the [facility] and [facility]. 5.  I understand that in the event of a medical emergency, I will be transported by local paramedics to [facility] or other hospital emergency department. 6.  I certify that I have read and fully understand the above consent to operation and/or other procedure. 7.  I acknowledge that my physician has explained sedation/analgesia administration to me including the risks and benefits. I consent to the administration of sedation/analgesia as may be necessary or desirable in the judgement of my physician.     Witness signature: ___________________________________________________ Date:  ______/______/_____                    Time: ________ A. M.  P.M. Patient Name:  ______________________________________________________  (please print)      Patient signature:  ___________________________________________________             Relationship to Patient:           []  Parent    Responsible person                          []  Spouse  In case of minor or                    [] Other  _____________   Incompetent name:  __________________________________________________                               (please print)      _____________      Responsible person  In case of minor or  Incompetent signature:  _______________________________________________    Statement of Physician  My signature below affirms that prior to the time of the procedure, I have explained to the patient and/or his/her guardian, the risks and benefits involved in the proposed treatment and any reasonable alternative to the proposed treatment. I have also explained the risks and benefits involved in the refusal of the proposed treatment and have answered the patient's questions.                         Date:  ______/______/_______  Provider                      Signature:  __________________________________________________________       Time:  ___________ A.M    P.M.

## (undated) NOTE — LETTER
Date: 7/23/2024    Patient Name: Ashley Ramirez          To Whom it may concern:    This letter has been written at the patient's request. The above patient was seen at [facility] for treatment of a medical condition.    This patient should be excused from attending work/school from 7/24/24.    The patient may return to work/school after 24 hours on antibiotics with the following limitations none.        Sincerely,        ODIN Lombardo, FNP-C  [facility]  07/23/24  10:11 AM

## (undated) NOTE — LETTER
[facility] [employer] of Child Health Examination       Student's Name  Ashley Ramirez Title                           Date     Signature HEALTH HISTORY          TO BE COMPLETED AND SIGNED BY PARENT/GUARDIAN AND VERIFIED BY HEALTH CARE PROVIDER    ALLERGIES  (Food, drug, insect, other)  Review of patient's allergies indicates no known allergies.  MEDICATION  (List all prescribed or taken on a PHYSICAL EXAMINATION REQUIREMENTS (head circumference if <33 years old):   /73   Pulse 98   Temp 97.8 °F (36.6 °C) (Oral)   Resp 20   Ht 5' 6\" (1.676 m)   Wt 190 lb (86.2 kg)   LMP 08/02/2017   BMI 30.67 kg/m²     DIABETES SCREENING  BMI>85% age/se Mouth/Dental Yes  Spinal examination Yes    Cardiovascular/HTN Yes  Nutritional status Yes    Respiratory Yes                   Diagnosis of Asthma: No Mental Health Yes        Currently Prescribed Asthma Medication:            Quick-relief  medication (e.

## (undated) NOTE — LETTER
VACCINE ADMINISTRATION RECORD  PARENT / GUARDIAN APPROVAL  Date: 2017  Vaccine administered to: Bin Fernandez     : 2005    MRN: [id number]    A copy of the appropriate Centers for Disease Control and Prevention Vaccine Information stateme

## (undated) NOTE — LETTER
Name:  Rene Drake School Year:  11th Grade Class: Student ID No.:   Address:  [address] [place] [address] Phone:  [phone] (home) [phone] (work) :  12year old   Name Relationship Lgl CtraRobert Nuñez 3 Work [employer] cardiomyopathy, long QT syndrome, short QT syndrome, Brugada syndrome, or catecholaminergic polymorphic ventricular tachycardia? No   15. Does anyone in your family have a heart problem, pacemaker, or implanted defibrillator? No   16.  Has anyone in your fa memory problems? No   36. Do you have a history of seizure disorder? No   37. Do you have headaches with exercise? No   38. Have you ever had numbness, tingling, or weakness in your arms or legs after being hit or falling? No   39. Have you ever been unable available as of 10/18/2021. female    Vision: R 20/13          L 20/13          BOTH 20/13          Corrected   MEDICAL NORMAL ABNORMAL FINDINGS   Appearance:  Marfan stigmata (kyphoscoliosis, high-arched palate, pectus excavatum,      arachnodactyly, arm students only)   3041-2384 school term    As a prerequisite to participation in dooyootic activities, we agree that I/our student will not use performance-enhancing substances as defined in the [place]

## (undated) NOTE — LETTER
Name:  Arturo Ruano School Year:  10th Grade Class: Student ID No.:   Address:  [address] [place] [address] Phone:  [phone] (home) [phone] (work) :  13year old   Name Relationship Lgl CtraRobert Nuñez 3 Work [employer] 15. Does anyone in your family have hypertrophic cardiomyopathy, Marfan syndrome, arrhythmogenic right ventricular cardiomyopathy, long QT syndrome, short QT syndrome, Brugada syndrome, or catecholaminergic polymorphic ventricular tachycardia? No   15.  [place] 29. Have you ever had a head injury or concussion? No   35. Have you ever had a hit or blow to the head that caused confusion, prolonged headache, or memory problems? No   36. Do you have a history of seizure disorder? No   37.  Do you have headaches with e /75   Pulse 79   Temp 98.2 °F (36.8 °C) (Temporal)   Ht 5' 7.2\" (1.707 m)   Wt 213 lb (96.6 kg)   LMP 08/16/2020 (Approximate)   BMI 33.16 kg/m²  98 %ile (Z= 2.10) based on CDC (Girls, 2-20 Years) BMI-for-age based on BMI available as of 9/19/2020. *effective January 2003, the [employer] of Directors approved a recommendation, consistent with the [facility] [employer], that allows [employer] or Advanced Nurse Practitioners to sign off on physicals.    [facility] Substance Testing Policy Consent t ©2010 AAFP, AAP, [address] [facility], [place] for [facility], [facility] for [address] of Sports Medicine.  Permission granted to reprint for noncommercial, educat

## (undated) NOTE — LETTER
Student's Name  Richard Main Birth Date  7/26/2005  Sex  Female School   Grade Level/ID#  9th Grade   Address  [address] Parent/Guardian      Telephone# - Home   Telephone# - Work                              [facility] 2 Title                           Date    (If adding dates to the above immunization history section, put your initials by date(s) and sign here.)   Signature Printed by the [employer]  (Complete Both Sides)    Student's Name  Armando Dinh [facility] Birth Date  7/26/2005  Sex  Female School   Grade Level/ID#  9th Grade   HEALTH HISTORY          TO BE COMPLETED AND SIGNED BY PARENT/GUARDIAN AN Signature                                                   Date     Entire section below to be completed by MD//TRUMAN/MIGUEL ANGEL Gal[employer] TESTING MANDATED FOR STATE LICENSED  FACILITIES)   PHYSICAL EXAMINATION REQUIREMENTS (head circumference if <2-3 Eyes Yes             Amblyopia  No   Genito-Urinary Yes  deferred     LMP 7/27/19    Nose Yes  Neurological Yes    Throat Yes  Musculoskeletal Yes    Mouth/Dental Yes  Spinal examination Yes    Cardiovascular/HTN Yes  Nutritional status Yes    Respiratory

## (undated) NOTE — LETTER
[facility]  Administration of Anesthesia  Ashley CARPENTER agree to be cared for by a physician anesthesiologist alone and/or with a nurse anesthetist, who is specially trained to monitor me and give me medicine to put me to sleep or keep me comfortable during my procedure    I understand that my anesthesiologist and/or anesthetist is not an employee or agent of [facility] or [employer]. He or she works for [facility]    As the patient asking for anesthesia services, I agree to:  Allow the anesthesiologist (anesthesia doctor) to give me medicine and do additional procedures as necessary. Some examples are: Starting or using an “IV” to give me medicine, fluids or blood during my procedure, and having a breathing tube placed to help me breathe when I’m asleep (intubation). In the event that my heart stops working properly, I understand that my anesthesiologist will make every effort to sustain my life, unless otherwise directed by [facility] Do Not Resuscitate documents.  Tell my anesthesia doctor before my procedure:  If I am pregnant.  The last time that I ate or drank.  iii. All of the medicines I take (including prescriptions, herbal supplements, and pills I can buy without a prescription (including street drugs/illegal medications). Failure to inform my anesthesiologist about these medicines may increase my risk of anesthetic complications.  iv.If I am allergic to anything or have had a reaction to anesthesia before.  I understand how the anesthesia medicine will help me (benefits).  I understand that with any type of anesthesia medicine there are risks:  The most common risks are: nausea, vomiting, sore throat, muscle soreness, damage to my eyes, mouth, or teeth (from breathing tube placement).  Rare risks include: remembering what happened during my procedure, allergic reactions to medications, injury to my airway, heart, lungs, vision, nerves,  or muscles and in extremely rare instances death.  My doctor has explained to me other choices available to me for my care (alternatives).  Pregnant Patients (“epidural”):  I understand that the risks of having an epidural (medicine given into my back to help control pain during labor), include itching, low blood pressure, difficulty urinating, headache or slowing of the baby’s heart. Very rare risks include infection, bleeding, seizure, irregular heart rhythms and nerve injury.  Regional Anesthesia (“spinal”, “epidural”, & “nerve blocks”):  I understand that rare but potential complications include headache, bleeding, infection, seizure, irregular heart rhythms, and nerve injury.    _____________________________________________________________________________  Patient (or Representative) Signature/Relationship to Patient  Date   Time    _____________________________________________________________________________   Name (if used)    Language/Organization   Time    _____________________________________________________________________________  Nurse Anesthetist Signature     Date   Time  _____________________________________________________________________________  Anesthesiologist Signature     Date   Time  I have discussed the procedure and information above with the patient (or patient’s representative) and answered their questions. The patient or their representative has agreed to have anesthesia services.    _____________________________________________________________________________  Witness        Date   Time  I have verified that the signature is that of the patient or patient’s representative, and that it was signed before the procedure  Patient Name: Ashley Ramirez     : 2005                 Printed: 2025 at 8:01 AM    Medical Record #: [id number]                                            Page 1 of 1  ----------ANESTHESIA CONSENT----------

## (undated) NOTE — LETTER
[facility] [employer] of Child Health Examination       Student's Name  Mario Chopra, [facility] Title                           Date     Signature Grade   HEALTH HISTORY          TO BE COMPLETED AND SIGNED BY PARENT/GUARDIAN AND VERIFIED BY HEALTH CARE PROVIDER    ALLERGIES  (Food, drug, insect, other)  Patient has no known allergies.  MEDICATION  (List all prescribed or taken on a regular basis.) completed by MD/DO/APN/PA       PHYSICAL EXAMINATION REQUIREMENTS (head circumference if <33 years old):   /66 (BP Location: Right arm, Patient Position: Sitting, Cuff Size: large)   Pulse 87   Resp 18   Ht 5' 7.9\" (1.725 m)   Wt 215 lb (97.5 kg) Musculoskeletal Yes    Mouth/Dental Yes  Spinal examination Yes    Cardiovascular/HTN Yes  Nutritional status Yes    Respiratory Yes                   Diagnosis of Asthma: No Mental Health Yes        Currently Prescribed Asthma Medication:            Quick

## (undated) NOTE — LETTER
10/27/2020              [facility] [address]        [address] [phone]         Dear [facility],    [facility] records indicate that the tests ordered for you by Luz Ortiz. MD Sarah  have not been done.   If you have, in fact, already completed

## (undated) NOTE — LETTER
VACCINE ADMINISTRATION RECORD  PARENT / GUARDIAN APPROVAL  Date: 2019  Vaccine administered to: Nolan To     : 2005    MRN: [id number]    A copy of the appropriate Centers for Disease Control and Prevention Vaccine Information stateme